# Patient Record
Sex: FEMALE | Race: WHITE | Employment: FULL TIME | URBAN - METROPOLITAN AREA
[De-identification: names, ages, dates, MRNs, and addresses within clinical notes are randomized per-mention and may not be internally consistent; named-entity substitution may affect disease eponyms.]

---

## 2017-04-12 ENCOUNTER — HOSPITAL ENCOUNTER (OUTPATIENT)
Dept: LAB | Age: 62
Discharge: HOME | End: 2017-04-12
Payer: COMMERCIAL

## 2017-04-12 ENCOUNTER — OFFICE VISIT (OUTPATIENT)
Dept: FAMILY MEDICINE CLINIC | Age: 62
End: 2017-04-12

## 2017-04-12 VITALS
OXYGEN SATURATION: 98 % | HEART RATE: 70 BPM | SYSTOLIC BLOOD PRESSURE: 94 MMHG | WEIGHT: 150 LBS | BODY MASS INDEX: 25.61 KG/M2 | HEIGHT: 64 IN | TEMPERATURE: 97.7 F | DIASTOLIC BLOOD PRESSURE: 65 MMHG | RESPIRATION RATE: 16 BRPM

## 2017-04-12 DIAGNOSIS — E78.5 HYPERLIPIDEMIA, UNSPECIFIED HYPERLIPIDEMIA TYPE: ICD-10-CM

## 2017-04-12 DIAGNOSIS — E16.1 HYPERINSULINEMIA: ICD-10-CM

## 2017-04-12 DIAGNOSIS — E03.4 HYPOTHYROIDISM DUE TO ACQUIRED ATROPHY OF THYROID: Primary | ICD-10-CM

## 2017-04-12 DIAGNOSIS — E55.9 VITAMIN D DEFICIENCY: ICD-10-CM

## 2017-04-12 DIAGNOSIS — M19.90 INFLAMMATORY OSTEOARTHRITIS: ICD-10-CM

## 2017-04-12 DIAGNOSIS — E03.4 HYPOTHYROIDISM DUE TO ACQUIRED ATROPHY OF THYROID: ICD-10-CM

## 2017-04-12 LAB
25(OH)D3 SERPL-MCNC: 36.8 NG/ML (ref 30–100)
ALBUMIN SERPL BCP-MCNC: 3.7 G/DL (ref 3.4–5)
ALBUMIN/GLOB SERPL: 1.4 {RATIO} (ref 0.8–1.7)
ALP SERPL-CCNC: 73 U/L (ref 45–117)
ALT SERPL-CCNC: 51 U/L (ref 13–56)
ANION GAP BLD CALC-SCNC: 6 MMOL/L (ref 3–18)
AST SERPL W P-5'-P-CCNC: 39 U/L (ref 15–37)
BASOPHILS # BLD AUTO: 0.1 K/UL (ref 0–0.06)
BASOPHILS # BLD: 1 % (ref 0–2)
BILIRUB SERPL-MCNC: 0.2 MG/DL (ref 0.2–1)
BUN SERPL-MCNC: 14 MG/DL (ref 7–18)
BUN/CREAT SERPL: 16 (ref 12–20)
CALCIUM SERPL-MCNC: 8.5 MG/DL (ref 8.5–10.1)
CHLORIDE SERPL-SCNC: 108 MMOL/L (ref 100–108)
CHOLEST SERPL-MCNC: 141 MG/DL
CO2 SERPL-SCNC: 29 MMOL/L (ref 21–32)
CREAT SERPL-MCNC: 0.87 MG/DL (ref 0.6–1.3)
DIFFERENTIAL METHOD BLD: ABNORMAL
EOSINOPHIL # BLD: 0.2 K/UL (ref 0–0.4)
EOSINOPHIL NFR BLD: 4 % (ref 0–5)
ERYTHROCYTE [DISTWIDTH] IN BLOOD BY AUTOMATED COUNT: 14.8 % (ref 11.6–14.5)
GLOBULIN SER CALC-MCNC: 2.7 G/DL (ref 2–4)
GLUCOSE SERPL-MCNC: 92 MG/DL (ref 74–99)
HBA1C MFR BLD: 6 % (ref 4.2–5.6)
HCT VFR BLD AUTO: 41.3 % (ref 35–45)
HDLC SERPL-MCNC: 78 MG/DL (ref 40–60)
HDLC SERPL: 1.8 {RATIO} (ref 0–5)
HGB BLD-MCNC: 12.7 G/DL (ref 12–16)
LDLC SERPL CALC-MCNC: 49.2 MG/DL (ref 0–100)
LIPID PROFILE,FLP: ABNORMAL
LYMPHOCYTES # BLD AUTO: 37 % (ref 21–52)
LYMPHOCYTES # BLD: 2.2 K/UL (ref 0.9–3.6)
MCH RBC QN AUTO: 27.3 PG (ref 24–34)
MCHC RBC AUTO-ENTMCNC: 30.8 G/DL (ref 31–37)
MCV RBC AUTO: 88.6 FL (ref 74–97)
MONOCYTES # BLD: 0.4 K/UL (ref 0.05–1.2)
MONOCYTES NFR BLD AUTO: 7 % (ref 3–10)
NEUTS SEG # BLD: 3.1 K/UL (ref 1.8–8)
NEUTS SEG NFR BLD AUTO: 51 % (ref 40–73)
PLATELET # BLD AUTO: 202 K/UL (ref 135–420)
PMV BLD AUTO: 12.1 FL (ref 9.2–11.8)
POTASSIUM SERPL-SCNC: 4.3 MMOL/L (ref 3.5–5.5)
PROT SERPL-MCNC: 6.4 G/DL (ref 6.4–8.2)
RBC # BLD AUTO: 4.66 M/UL (ref 4.2–5.3)
SODIUM SERPL-SCNC: 143 MMOL/L (ref 136–145)
TRIGL SERPL-MCNC: 69 MG/DL (ref ?–150)
TSH SERPL DL<=0.05 MIU/L-ACNC: 3.14 UIU/ML (ref 0.36–3.74)
VLDLC SERPL CALC-MCNC: 13.8 MG/DL
WBC # BLD AUTO: 6.1 K/UL (ref 4.6–13.2)

## 2017-04-12 PROCEDURE — 80061 LIPID PANEL: CPT | Performed by: INTERNAL MEDICINE

## 2017-04-12 PROCEDURE — 85025 COMPLETE CBC W/AUTO DIFF WBC: CPT | Performed by: INTERNAL MEDICINE

## 2017-04-12 PROCEDURE — 83036 HEMOGLOBIN GLYCOSYLATED A1C: CPT | Performed by: INTERNAL MEDICINE

## 2017-04-12 PROCEDURE — 84443 ASSAY THYROID STIM HORMONE: CPT | Performed by: INTERNAL MEDICINE

## 2017-04-12 PROCEDURE — 82306 VITAMIN D 25 HYDROXY: CPT | Performed by: INTERNAL MEDICINE

## 2017-04-12 PROCEDURE — 80053 COMPREHEN METABOLIC PANEL: CPT | Performed by: INTERNAL MEDICINE

## 2017-04-12 PROCEDURE — 36415 COLL VENOUS BLD VENIPUNCTURE: CPT | Performed by: INTERNAL MEDICINE

## 2017-04-12 NOTE — PROGRESS NOTES
Denver Roth is here today to follow up for chronic conditions. 1. Have you been to the ER, urgent care clinic since your last visit? Hospitalized since your last visit? No    2. Have you seen or consulted any other health care providers outside of the [facility] since your last visit? Include any pap smears or colon screening.  No

## 2017-04-12 NOTE — PATIENT INSTRUCTIONS

## 2017-04-12 NOTE — MR AVS SNAPSHOT
Visit Information Date & Time Provider Department Dept. Phone Encounter #  
 4/12/2017  8:45 AM Matt Tristan MD [facility] [id number] Follow-up Instructions Return in about 6 months (around 10/12/2017) for follow up. Upcoming Health Maintenance Date Due  
 GLAUCOMA SCREENING Q2Y 1/1/2017 BREAST CANCER SCRN MAMMOGRAM 6/1/2018 COLONOSCOPY 3/13/2019 PAP AKA CERVICAL CYTOLOGY 10/5/2019 DTaP/Tdap/Td series (2 - Td) 4/17/2025 Allergies as of 4/12/2017  Review Complete On: 4/12/2017 By: Matt Tristan MD  
  
 Severity Noted Reaction Type Reactions Iodinated Contrast Media - Oral And Iv Dye  03/27/2013    Other (comments) Severe overall skin reaction Prilosec [Omeprazole Magnesium]  03/27/2013    Nausea Only Current Immunizations  Reviewed on 10/21/2015 Name Date Influenza Vaccine 10/11/2016 Tdap 4/17/2015 Zoster Vaccine, Live 1/1/2015 Not reviewed this visit You Were Diagnosed With   
  
 Codes Comments Hypothyroidism due to acquired atrophy of thyroid    -  Primary ICD-10-CM: E03.4 ICD-9-CM: 244.8, 246.8 Hyperlipidemia, unspecified hyperlipidemia type     ICD-10-CM: E78.5 ICD-9-CM: 272.4 Vitamin D deficiency     ICD-10-CM: E55.9 ICD-9-CM: 268.9 Inflammatory osteoarthritis     ICD-10-CM: M19.90 ICD-9-CM: 715.90 Hyperinsulinemia     ICD-10-CM: E16.1 ICD-9-CM: 251.1 Vitals BP Pulse Temp Resp Height(growth percentile) Weight(growth percentile) 94/65 70 97.7 °F (36.5 °C) (Oral) 16 5' 4\" (1.626 m) 150 lb (68 kg) SpO2 BMI OB Status Smoking Status 98% 25.75 kg/m2 Postmenopausal Never Smoker Vitals History BMI and BSA Data Body Mass Index Body Surface Area 25.75 kg/m 2 1.75 m 2 Preferred Pharmacy Pharmacy Name Phone [address] [facility]. [phone] Your Updated Medication List  
  
 This list is accurate as of: 4/12/17  9:29 AM.  Always use your most recent med list.  
  
  
  
  
 CLARITIN 10 mg tablet Generic drug:  loratadine Take 10 mg by mouth daily. CRESTOR 20 mg tablet Generic drug:  rosuvastatin Take 20 mg by mouth nightly. multivitamin tablet Commonly known as:  ONE A DAY Take 1 Tab by mouth daily. PLAQUENIL 200 mg tablet Generic drug:  hydroxychloroquine Take 200 mg by mouth two (2) times a day. TYLENOL 325 mg tablet Generic drug:  acetaminophen Take  by mouth every four (4) hours as needed. VAGIFEM 10 mcg Tab vaginal tablet Generic drug:  estradiol Insert 10 mcg into vagina daily. VITAMIN D3 2,000 unit Tab Generic drug:  cholecalciferol (vitamin D3) Take  by mouth.  
  
 vitamin E 400 unit capsule Commonly known as:  [address] Take  by mouth daily. Take half tab daily ZANTAC 150 mg tablet Generic drug:  raNITIdine Take 150 mg by mouth two (2) times a day. Follow-up Instructions Return in about 6 months (around 10/12/2017) for follow up. To-Do List   
 04/12/2017 Lab:  CBC WITH AUTOMATED DIFF   
  
 04/12/2017 Lab:  HEMOGLOBIN A1C W/O EAG   
  
 04/12/2017 Lab:  LIPID PANEL   
  
 04/12/2017 Lab:  METABOLIC PANEL, COMPREHENSIVE   
  
 04/12/2017 Lab:  TSH 3RD GENERATION   
  
 04/12/2017 Lab:  VITAMIN D, 25 HYDROXY Patient Instructions High Cholesterol: Care Instructions Your Care Instructions Cholesterol is a type of fat in your blood. It is needed for many body functions, such as making new cells. Cholesterol is made by your body. It also comes from food you eat. High cholesterol means that you have too much of the fat in your blood. This raises your risk of a heart attack and stroke. LDL and HDL are part of your total cholesterol. LDL is the \"bad\" cholesterol.  High LDL can raise your risk for heart disease, heart attack, and stroke. HDL is the \"good\" cholesterol. It helps clear bad cholesterol from the body. High HDL is linked with a lower risk of heart disease, heart attack, and stroke. Your cholesterol levels help your doctor find out your risk for having a heart attack or stroke. You and your doctor can talk about whether you need to lower your risk and what treatment is best for you. A heart-healthy lifestyle along with medicines can help lower your cholesterol and your risk. The way you choose to lower your risk will depend on how high your risk is for heart attack and stroke. It will also depend on how you feel about taking medicines. Follow-up care is a key part of your treatment and safety. Be sure to make and go to all appointments, and call your doctor if you are having problems. It's also a good idea to know your test results and keep a list of the medicines you take. How can you care for yourself at home? · Eat a variety of foods every day. Good choices include fruits, vegetables, whole grains (like oatmeal), dried beans and peas, nuts and seeds, soy products (like tofu), and fat-free or low-fat dairy products. · Replace butter, margarine, and hydrogenated or partially hydrogenated oils with olive and canola oils. (Canola oil margarine without trans fat is fine.) · Replace red meat with fish, poultry, and soy protein (like tofu). · Limit processed and packaged foods like chips, crackers, and cookies. · Bake, broil, or steam foods. Don't osman them. · Be physically active. Get at least 30 minutes of exercise on most days of the week. Walking is a good choice. You also may want to do other activities, such as running, swimming, cycling, or playing tennis or team sports. · Stay at a healthy weight or lose weight by making the changes in eating and physical activity listed above. Losing just a small amount of weight, even 5 to 10 pounds, can reduce your risk for having a heart attack or stroke. · Do not smoke. When should you call for help? Watch closely for changes in your health, and be sure to contact your doctor if: 
· You need help making lifestyle changes. · You have questions about your medicine. Where can you learn more? Go to http://carina-alec.info/. Enter C267 in the search box to learn more about \"High Cholesterol: Care Instructions. \" Current as of: January 27, 2016 Content Version: 11.2 © 4707-1198 [employer]. Care instructions adapted under license by [employer] (which disclaims liability or warranty for this information). If you have questions about a medical condition or this instruction, always ask your healthcare professional. [address] any warranty or liability for your use of this information. Introducing [facility]! Dear Ghazala Teague: 
Thank you for requesting a [employer] account. Our records indicate that you already have an active [employer] account. You can access your account anytime at [email or web address] Did you know that you can access your hospital and ER discharge instructions at any time in [employer]? You can also review all of your test results from your hospital stay or ER visit. Additional Information If you have questions, please visit the Frequently Asked Questions section of the [employer] website at [email or web address] Remember, [employer] is NOT to be used for urgent needs. For medical emergencies, dial 911. Now available from your iPhone and Android! Please provide this summary of care documentation to your next provider. Your primary care clinician is listed as Joel Moralez. If you have any questions after today's visit, please call [phone].

## 2017-04-12 NOTE — PROGRESS NOTES
Chief Complaint   Patient presents with    Osteopenia     Follow up    Hypothyroidism    Cholesterol Problem    Osteoarthritis       Pt is a 60y.o. year old female who presents for follow up of her chronic medical problems    BP Readings from Last 3 Encounters:   04/12/17 94/65   10/19/16 106/73   04/20/16 110/78   Occasional dizziness    Wt Readings from Last 3 Encounters:   04/12/17 150 lb (68 kg)   10/19/16 147 lb 3.2 oz (66.8 kg)   10/21/15 170 lb 9.6 oz (77.4 kg)   148 lbs at home  Has been watching diet carefully; avoiding carbs for the most part; has maintained the weight loss for 6 months now! Lab Results   Component Value Date/Time    Cholesterol, total 158 10/19/2016 09:07 AM    HDL Cholesterol 64 10/19/2016 09:07 AM    LDL, calculated 77.6 10/19/2016 09:07 AM    VLDL, calculated 16.4 10/19/2016 09:07 AM    Triglyceride 82 10/19/2016 09:07 AM    CHOL/HDL Ratio 2.5 10/19/2016 09:07 AM   Fasting today  On Crestor-compliant and tolerating it    Plaquenil helping the pain on her fingers; sees Dr. 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 Jaiden Lopez quite 2 years yet       ROS:    Pt denies: Fever/Chills, HA, Visual changes, Fatigue, Chest pain, SOB, ROBERTS, Abd pain, N/V/D/C, Blood in stool or urine, Edema. Pertinent positive as above in HPI.  All others were negative    Patient Active Problem List   Diagnosis Code    Hyperlipidemia E78.5    Hyperinsulinemia E16.1    Vitamin D deficiency E55.9    Inflammatory osteoarthritis M19.90    Colon polyp K63.5    Family history of osteoporosis Z82.62    Hypothyroidism due to acquired atrophy of thyroid E03.4    Osteopenia M85.80    Advance directive discussed with patient Z70.80       Past Medical History:   Diagnosis Date    Anemia NEC     as a young child    Arthritis     GERD (gastroesophageal reflux disease)     Hypercholesterolemia     Hypothyroidism due to acquired atrophy of thyroid 4/22/2015       Current Outpatient Prescriptions   Medication Sig Dispense Refill    ranitidine (ZANTAC) 150 mg tablet Take 150 mg by mouth two (2) times a day.  estradiol (VAGIFEM) 10 mcg tab vaginal tablet Insert 10 mcg into vagina daily.  hydroxychloroquine (PLAQUENIL) 200 mg tablet Take 200 mg by mouth two (2) times a day.  rosuvastatin (CRESTOR) 20 mg tablet Take 20 mg by mouth nightly.  cholecalciferol, vitamin D3, (VITAMIN D3) 2,000 unit Tab Take  by mouth.  vitamin E (AQUA GEMS) 400 unit capsule Take  by mouth daily. Take half tab daily      loratadine (CLARITIN) 10 mg tablet Take 10 mg by mouth daily.  multivitamin (ONE A DAY) tablet Take 1 Tab by mouth daily.  acetaminophen (TYLENOL) 325 mg tablet Take  by mouth every four (4) hours as needed. History   Smoking Status    Never Smoker   Smokeless Tobacco    Never Used       Allergies   Allergen Reactions    Iodinated Contrast Media - Oral And Iv Dye Other (comments)     Severe overall skin reaction    Prilosec [Omeprazole Magnesium] Nausea Only       Patient Labs were reviewed: yes      Patient Past Records were reviewed:  yes        Objective:     Vitals:    04/12/17 0859   BP: 94/65   Pulse: 70   Resp: 16   Temp: 97.7 °F (36.5 °C)   TempSrc: Oral   SpO2: 98%   Weight: 150 lb (68 kg)   Height: 5' 4\" (1.626 m)     Body mass index is 25.75 kg/(m^2). Exam:   Appearance: alert, well appearing,  oriented to person, place, and time, acyanotic, in no respiratory distress and well hydrated.   HEENT:  NC/AT, pink conj, anicteric sclerae  Neck:  No cervical lymphadenopathy, no JVD, no thyromegaly, no carotid bruit  Heart:  RRR without M/R/G  Lungs:  CTAB, no rhonchi, rales, or wheezes with good air exchange   Abdomen:  Not examined  Ext:  No C/C/E , bony abnormalities noted on the PIP joints of the little fingers   Skin: no rash  Neuro: no lateralizing signs, CNs II-XII intact      Assessment/ Plan: [facility] was seen today for osteopenia, hypothyroidism, cholesterol problem and osteoarthritis. Diagnoses and all orders for this visit:    Hypothyroidism due to acquired atrophy of thyroid-subclinical; neg anti TPO  -     TSH 3RD GENERATION; Future    Hyperlipidemia, unspecified hyperlipidemia type-on Crestor; mild elevation of LFTs with last labs-if this goes up some more, may need to decrease dose of Crestor lion after she lost weight; also currently on CoQ10 to help decrease risk of the side effect of hyperglycemia from statins  -     LIPID PANEL; Future  -     METABOLIC PANEL, COMPREHENSIVE; Future    Vitamin D deficiency-on OTC vit D  -     VITAMIN D, 25 HYDROXY; Future    Inflammatory osteoarthritis-on Plaquenil, will let Christine know that she is in Plaquenil  -     CBC WITH AUTOMATED DIFF; Future    Hyperinsulinemia-likely resolved from recent weight loss by diet and exercise   -     HEMOGLOBIN A1C W/O EAG; Future        Follow-up Disposition:  Return in about 6 months (around 10/12/2017) for follow up. I have discussed the diagnosis with the patient and the intended plan as seen in the above orders. The patient has received an After-Visit Summary and questions were answered concerning future plans. Medication Side Effects and Warnings were discussed with patient: yes    Patient verbalized understanding of above instructions.     Zay Moss MD  Internal Medicine  [facility]

## 2017-05-30 DIAGNOSIS — N95.2 ATROPHIC VAGINITIS: Primary | ICD-10-CM

## 2017-05-30 RX ORDER — ESTRADIOL 10 UG/1
10 INSERT VAGINAL DAILY
Qty: 30 TAB | Refills: 6 | Status: SHIPPED | OUTPATIENT
Start: 2017-05-30 | End: 2017-10-03 | Stop reason: SDUPTHER

## 2017-10-03 DIAGNOSIS — N95.2 ATROPHIC VAGINITIS: ICD-10-CM

## 2017-10-04 RX ORDER — ESTRADIOL 10 UG/1
10 INSERT VAGINAL DAILY
Qty: 90 TAB | Refills: 3 | Status: SHIPPED | OUTPATIENT
Start: 2017-10-04 | End: 2017-10-10 | Stop reason: SDUPTHER

## 2017-10-04 NOTE — TELEPHONE ENCOUNTER
From: Yarelis Prince  To: Julián Wooten MD  Sent: 10/3/2017 9:52 PM EDT  Subject: Medication Renewal Request    Original authorizing provider: MD Edin Parada would like a refill of the following medications:  estradiol (VAGIFEM) 10 mcg tab vaginal tablet Julián Wooten MD]    Preferred pharmacy: [place] [id number], [address] [facility]. Comment:  please send a 90 day supply to [facility] pharmacy. The system will not let me change it here. Thanks.  Flaca Prince

## 2017-10-10 DIAGNOSIS — N95.2 ATROPHIC VAGINITIS: ICD-10-CM

## 2017-10-10 RX ORDER — ESTRADIOL 10 UG/1
10 INSERT VAGINAL DAILY
Qty: 90 TAB | Refills: 3 | Status: SHIPPED | OUTPATIENT
Start: 2017-10-10 | End: 2019-04-10 | Stop reason: SDUPTHER

## 2017-10-10 NOTE — TELEPHONE ENCOUNTER
From    Mindy Prince       To    Young Harris VIVA [place]       Sent    10/4/2017  8:43 PM          Good evening,   I requested a refill for my Vagifem to be sent to [address] [place] [address]. In the message I indicated that I was unable to set that as the pharmacy and assumed you would. But the refill was sent to [facility]].  Please change my preferred pharmacy to [facility].

## 2017-10-11 ENCOUNTER — OFFICE VISIT (OUTPATIENT)
Dept: FAMILY MEDICINE CLINIC | Age: 62
End: 2017-10-11

## 2017-10-11 VITALS
SYSTOLIC BLOOD PRESSURE: 101 MMHG | DIASTOLIC BLOOD PRESSURE: 77 MMHG | HEART RATE: 75 BPM | HEIGHT: 64 IN | TEMPERATURE: 96.9 F | RESPIRATION RATE: 17 BRPM | WEIGHT: 152 LBS | BODY MASS INDEX: 25.95 KG/M2

## 2017-10-11 DIAGNOSIS — M85.80 OSTEOPENIA, UNSPECIFIED LOCATION: ICD-10-CM

## 2017-10-11 DIAGNOSIS — E55.9 VITAMIN D DEFICIENCY: ICD-10-CM

## 2017-10-11 DIAGNOSIS — K21.9 GASTROESOPHAGEAL REFLUX DISEASE, ESOPHAGITIS PRESENCE NOT SPECIFIED: ICD-10-CM

## 2017-10-11 DIAGNOSIS — E16.1 HYPERINSULINEMIA: ICD-10-CM

## 2017-10-11 DIAGNOSIS — M19.90 INFLAMMATORY OSTEOARTHRITIS: ICD-10-CM

## 2017-10-11 DIAGNOSIS — E78.5 HYPERLIPIDEMIA, UNSPECIFIED HYPERLIPIDEMIA TYPE: Primary | ICD-10-CM

## 2017-10-11 DIAGNOSIS — E03.4 HYPOTHYROIDISM DUE TO ACQUIRED ATROPHY OF THYROID: ICD-10-CM

## 2017-10-11 DIAGNOSIS — Z71.84 TRAVEL ADVICE ENCOUNTER: ICD-10-CM

## 2017-10-11 RX ORDER — ACETAZOLAMIDE 125 MG/1
125 TABLET ORAL 2 TIMES DAILY
Qty: 14 TAB | Refills: 0 | Status: SHIPPED | OUTPATIENT
Start: 2017-10-11 | End: 2017-10-11 | Stop reason: SDUPTHER

## 2017-10-11 RX ORDER — ACETAZOLAMIDE 125 MG/1
125 TABLET ORAL 2 TIMES DAILY
Qty: 14 TAB | Refills: 0 | Status: SHIPPED | OUTPATIENT
Start: 2017-10-11 | End: 2017-10-18

## 2017-10-11 RX ORDER — ROSUVASTATIN CALCIUM 20 MG/1
20 TABLET, COATED ORAL
Qty: 90 TAB | Refills: 3 | Status: SHIPPED | OUTPATIENT
Start: 2017-10-11 | End: 2018-12-03 | Stop reason: SDUPTHER

## 2017-10-11 NOTE — PROGRESS NOTES
Chief Complaint   Patient presents with    Follow Up Chronic Condition     Cholesterol, Vitamin D     1. Have you been to the ER, urgent care clinic since your last visit? Hospitalized since your last visit? No    2. Have you seen or consulted any other health care providers outside of the [address] [place] [address] since your last visit? Include any pap smears or colon screening. Yes When: July 2017  Rheumologist- Dr. Benito Sic.

## 2017-10-11 NOTE — PATIENT INSTRUCTIONS
Learning About Healthy Travel Abroad  How can you stay healthy on your trip? The best way to stay healthy on your trip is to plan ahead. Talk with your doctor several months before you travel to another country. It's important to allow enough time to get the vaccine doses that you need. For example, if you need the hepatitis A vaccine, you'll need 2 doses spaced at least 6 months apart. Also ask your doctor if there are medicines or extra safety steps that you should take. Check with your local health department or travel health clinic for other travel tips. What can you do to prevent health problems? Get needed vaccines  · Make sure you are up to date with your routine shots. They can protect you from diseases such as polio, diphtheria, and measles. These diseases are still a problem in some developing countries. · Get other vaccines you need. Your doctor or a health clinic can tell you which ones you need for your travels. Here are some examples:  ¨ Hepatitis A vaccine, if you travel to developing countries. ¨ Yellow fever vaccine, if you visit places in [place] and [place] where the disease is active. ¨ Typhoid fever vaccine, if you travel to [facility] and [place], [place], or some areas of [place]. Bring medicines with you  · If you take medicines, bring a supply that will last the length of your trip. Get a letter from your doctor that lists your medical conditions and the medicines you take. Bring prescriptions for refills if you will be gone for a long time. Also bring any medical supplies you may need such as blood sugar testing supplies or insulin needles. · If you are going to an area where malaria is a risk, ask your doctor or health clinic for a prescription to help prevent infection. This medicine works best if you take it before, during, and after your trip. · You may want to bring medicine for traveler's diarrhea.  Over-the-counter medicines include:  ¨ Bismuth subsalicylate (Pepto-Bismol). ¨ Loperamide (Imodium). Your doctor may also prescribe an antibiotic to take with you. This can treat diarrhea if you're going to an area where modern medical care isn't readily available. Make safer choices as you travel  · Practice safer sex. Using condoms can prevent sexually transmitted infections. · In malaria-infected areas, use DEET insect repellent. Wear long pants and long-sleeved shirts, especially from dusk to alex. Use mosquito netting to protect yourself from bites while you sleep. · Many developing countries don't have safe tap water. Only have drinks made with boiled water, such as tea and coffee. Canned or bottled carbonated drinks, such as soda, beer, wine, or water, are usually safe. Don't use ice if you don't know what kind of water was used to make it. And don't use tap water to brush your teeth. · Be aware that you could be injured in cars, boats, or public transportation. Driving can be dangerous due to bad roads, poor  training, and crowded roadways. If you hire a  or taxi, ask the  to slow down or drive more carefully if you feel unsafe. · Air pollution in some large cities can be a problem if you have asthma or other breathing problems. Avoid those cities when air quality is poor. Or stay indoors as much as possible. · Be careful around dogs and other animals. Dogs in developing countries are often not tame and may bite. Rabies is more common in tropical and subtropical regions. · If you're going to a place that's much higher above sea level than you're used to, ask your doctor how to avoid altitude sickness. He or she may also prescribe medicine to help treat it. Where can you get the best information? · Use the Internet to find travel health information. Try these websites:  ¨ www.cdc.gov/travel. This website is for the Centers for Disease Control and Prevention (CDC). ¨ www.who.int/ith/en.  This website lists information from the [address] [employer] (WHO) on travel, required immunizations, and disease outbreaks. · Find out where you can get the best medical care in the region you are visiting. See the [address] website at www.aScentias.gov. It lists every [place] embassy worldwide. It also lists some doctors and medical facilities in those countries. · Take along the phone numbers and addresses of embassies in the areas you will visit. They can help you find a doctor or hospital. Find out if your insurance company will cover you. You may want to get special travel health insurance. · If you are taking a cruise, you can find your ship's health record on this website: www.cdc.gov/nceh/vsp. Where can you learn more? Go to http://carina-alec.info/. Enter R129 in the search box to learn more about \"Learning About Healthy Travel Abroad. \"  Current as of: March 3, 2017  Content Version: 11.3  © 4944-4928 [employer]. Care instructions adapted under license by [employer] (which disclaims liability or warranty for this information). If you have questions about a medical condition or this instruction, always ask your healthcare professional. [address] any warranty or liability for your use of this information.

## 2017-10-11 NOTE — MR AVS SNAPSHOT
Visit Information Date & Time Provider Department Dept. Phone Encounter #  
 10/11/2017  8:30 AM Ludivina Henson MD [facility] [address] [id number] Follow-up Instructions Return in about 6 months (around 4/11/2018) for follow up. Routing History Upcoming Health Maintenance Date Due  
 GLAUCOMA SCREENING Q2Y 1/1/2017 INFLUENZA AGE 9 TO ADULT 8/1/2017 COLONOSCOPY 3/13/2019 BREAST CANCER SCRN MAMMOGRAM 6/7/2019 PAP AKA CERVICAL CYTOLOGY 10/5/2019 DTaP/Tdap/Td series (2 - Td) 4/17/2025 Allergies as of 10/11/2017  Review Complete On: 10/11/2017 By: Ludivina Henson MD  
  
 Severity Noted Reaction Type Reactions Iodinated Contrast- Oral And Iv Dye  03/27/2013    Other (comments) Severe overall skin reaction Prilosec [Omeprazole Magnesium]  03/27/2013    Nausea Only Current Immunizations  Reviewed on 10/11/2017 Name Date Influenza Vaccine 9/25/2017, 10/11/2016 Tdap 4/17/2015 Zoster Vaccine, Live 1/1/2015 Reviewed by Ludivina Henson MD on 10/11/2017 at  8:53 AM  
You Were Diagnosed With   
  
 Codes Comments Hyperlipidemia, unspecified hyperlipidemia type    -  Primary ICD-10-CM: E78.5 ICD-9-CM: 272.4 Vitamin D deficiency     ICD-10-CM: E55.9 ICD-9-CM: 268.9 Hypothyroidism due to acquired atrophy of thyroid     ICD-10-CM: E03.4 ICD-9-CM: 244.8, 246.8 Hyperinsulinemia     ICD-10-CM: E16.1 ICD-9-CM: 251.1 Travel advice encounter     ICD-10-CM: Z71.89 ICD-9-CM: V65.49 Vitals BP Pulse Temp Resp Height(growth percentile) Weight(growth percentile) 101/77 75 96.9 °F (36.1 °C) 17 5' 4\" (1.626 m) 152 lb (68.9 kg) BMI OB Status Smoking Status 26.09 kg/m2 Postmenopausal Never Smoker BMI and BSA Data Body Mass Index Body Surface Area 26.09 kg/m 2 1.76 m 2 Preferred Pharmacy Pharmacy Name Phone [place] [address] [address] [phone] Your Updated Medication List  
  
   
This list is accurate as of: 10/11/17  9:07 AM.  Always use your most recent med list.  
  
  
  
  
 acetaZOLAMIDE 125 mg tablet Commonly known as:  DIAMOX Take 1 Tab by mouth two (2) times a day for 7 days. CLARITIN 10 mg tablet Generic drug:  loratadine Take 10 mg by mouth daily. estradiol 10 mcg Tab vaginal tablet Commonly known as:  Rockney Mohs Insert 1 Tab into vagina daily. multivitamin tablet Commonly known as:  ONE A DAY Take 1 Tab by mouth daily. PLAQUENIL 200 mg tablet Generic drug:  hydroxychloroquine Take 200 mg by mouth two (2) times a day. rosuvastatin 20 mg tablet Commonly known as:  CRESTOR Take 1 Tab by mouth nightly. TYLENOL 325 mg tablet Generic drug:  acetaminophen Take  by mouth every four (4) hours as needed. VITAMIN D3 2,000 unit Tab Generic drug:  cholecalciferol (vitamin D3) Take  by mouth.  
  
 vitamin E 400 unit capsule Commonly known as:  [address] Take  by mouth daily. Take half tab daily ZANTAC 150 mg tablet Generic drug:  raNITIdine Take 150 mg by mouth two (2) times a day. Prescriptions Sent to Pharmacy Refills  
 rosuvastatin (CRESTOR) 20 mg tablet 3 Sig: Take 1 Tab by mouth nightly. Class: Normal  
 Pharmacy: [address] Ph #: [phone] Route: Oral  
 acetaZOLAMIDE (DIAMOX) 125 mg tablet 0 Sig: Take 1 Tab by mouth two (2) times a day for 7 days. Class: Normal  
 Pharmacy: [address] Ph #: [phone] Route: Oral  
  
Follow-up Instructions Return in about 6 months (around 4/11/2018) for follow up. To-Do List   
 10/11/2017 Lab:  CBC WITH AUTOMATED DIFF   
  
 10/11/2017 Lab:  HEMOGLOBIN A1C W/O EAG   
  
 10/11/2017 Lab:  LIPID PANEL   
  
 10/11/2017 Lab: METABOLIC PANEL, COMPREHENSIVE   
  
 10/11/2017 Lab:  TSH 3RD GENERATION   
  
 10/11/2017 Lab:  VITAMIN D, 25 HYDROXY Patient Instructions Learning About Healthy Travel Abroad How can you stay healthy on your trip? The best way to stay healthy on your trip is to plan ahead. Talk with your doctor several months before you travel to another country. It's important to allow enough time to get the vaccine doses that you need. For example, if you need the hepatitis A vaccine, you'll need 2 doses spaced at least 6 months apart. Also ask your doctor if there are medicines or extra safety steps that you should take. Check with your local health department or travel health clinic for other travel tips. What can you do to prevent health problems? Get needed vaccines · Make sure you are up to date with your routine shots. They can protect you from diseases such as polio, diphtheria, and measles. These diseases are still a problem in some developing countries. · Get other vaccines you need. Your doctor or a health clinic can tell you which ones you need for your travels. Here are some examples: 
¨ Hepatitis A vaccine, if you travel to developing countries. ¨ Yellow fever vaccine, if you visit places in [place] and [place] where the disease is active. ¨ Typhoid fever vaccine, if you travel to [facility] and [place], [place], or some areas of [place]. Bring medicines with you · If you take medicines, bring a supply that will last the length of your trip. Get a letter from your doctor that lists your medical conditions and the medicines you take. Bring prescriptions for refills if you will be gone for a long time. Also bring any medical supplies you may need such as blood sugar testing supplies or insulin needles. · If you are going to an area where malaria is a risk, ask your doctor or health clinic for a prescription to help prevent infection.  This medicine works best if you take it before, during, and after your trip. · You may want to bring medicine for traveler's diarrhea. Over-the-counter medicines include: ¨ Bismuth subsalicylate (Pepto-Bismol). ¨ Loperamide (Imodium). Your doctor may also prescribe an antibiotic to take with you. This can treat diarrhea if you're going to an area where modern medical care isn't readily available. Make safer choices as you travel · Practice safer sex. Using condoms can prevent sexually transmitted infections. · In malaria-infected areas, use DEET insect repellent. Wear long pants and long-sleeved shirts, especially from dusk to alex. Use mosquito netting to protect yourself from bites while you sleep. · Many developing countries don't have safe tap water. Only have drinks made with boiled water, such as tea and coffee. Canned or bottled carbonated drinks, such as soda, beer, wine, or water, are usually safe. Don't use ice if you don't know what kind of water was used to make it. And don't use tap water to brush your teeth. · Be aware that you could be injured in cars, boats, or public transportation. Driving can be dangerous due to bad roads, poor  training, and crowded roadways. If you hire a  or taxi, ask the  to slow down or drive more carefully if you feel unsafe. · Air pollution in some large cities can be a problem if you have asthma or other breathing problems. Avoid those cities when air quality is poor. Or stay indoors as much as possible. · Be careful around dogs and other animals. Dogs in developing countries are often not tame and may bite. Rabies is more common in tropical and subtropical regions. · If you're going to a place that's much higher above sea level than you're used to, ask your doctor how to avoid altitude sickness. He or she may also prescribe medicine to help treat it. Where can you get the best information? · Use the Internet to find travel health information. Try these websites: ¨ www.cdc.gov/travel. This website is for the Centers for Disease Control and Prevention (CDC). ¨ www.who.int/ith/en. This website lists information from the [address]) on travel, required immunizations, and disease outbreaks. · Find out where you can get the best medical care in the region you are visiting. See the [address] [place] [address] [employer]'s website at www.Pixium Vision.gov. It lists every [place] embassy worldwide. It also lists some doctors and medical facilities in those countries. · Take along the phone numbers and addresses of embassies in the areas you will visit. They can help you find a doctor or hospital. Find out if your insurance company will cover you. You may want to get special travel health insurance. · If you are taking a cruise, you can find your ship's health record on this website: www.[facility].gov/nceh/vsp. Where can you learn more? Go to http://carina-alec.info/. Enter R129 in the search box to learn more about \"Learning About Healthy Travel Abroad. \" Current as of: March 3, 2017 Content Version: 11.3 © 2836-0532 Knovel, Incorporated. Care instructions adapted under license by [employer] (which disclaims liability or warranty for this information). If you have questions about a medical condition or this instruction, always ask your healthcare professional. [place] [address] any warranty or liability for your use of this information. Introducing [facility] [place] & HEALTH SERVICES! Dear Sage Mays: 
Thank you for requesting a CharityStars account. Our records indicate that you already have an active CharityStars account. You can access your account anytime at https://"Ambition[employer]". Digital Guardian/"[employer] Inc" Did you know that you can access your hospital and ER discharge instructions at any time in CharityStars?   You can also review all of your test results from your hospital stay or ER visit. Additional Information If you have questions, please visit the Frequently Asked Questions section of the SKINNYprice website at https://[email or web address] Cave [email or web address]. [email or web address].[email or web address]. [employer]/mychart/. Remember, SKINNYprice is NOT to be used for urgent needs. For medical emergencies, dial 911. Now available from your iPhone and Android! Please provide this summary of care documentation to your next provider. Your primary care clinician is listed as Nano Tomlin. If you have any questions after today's visit, please call [phone].

## 2017-10-11 NOTE — PROGRESS NOTES
Chief Complaint   Patient presents with    Follow Up Chronic Condition     Cholesterol, Vitamin D       Pt is a 60y.o. year old female who presents for follow up of her chronic medical problems    Mom passed away recently at age 80    Going [facility] and [facility]-needs diamox for possible altitude sickness    Health Maintenance Due   Topic Date Due    GLAUCOMA SCREENING Q2Y  01/01/2017-done    INFLUENZA AGE 9 TO ADULT  08/01/2017-done     Wt Readings from Last 3 Encounters:   10/11/17 152 lb (68.9 kg)   04/12/17 150 lb (68 kg)   10/19/16 147 lb 3.2 oz (66.8 kg)     Lab Results   Component Value Date/Time    Cholesterol, total 141 04/12/2017 09:29 AM    HDL Cholesterol 78 04/12/2017 09:29 AM    LDL, calculated 49.2 04/12/2017 09:29 AM    VLDL, calculated 13.8 04/12/2017 09:29 AM    Triglyceride 69 04/12/2017 09:29 AM    CHOL/HDL Ratio 1.8 04/12/2017 09:29 AM   fasting today  On Crestor-no side effects    Lab Results   Component Value Date/Time    TSH 3.14 04/12/2017 09:29 AM    TSH 3.57 04/22/2015 09:00 AM   Has maintained her weight loss by watching diet closely     Previous  hx of elevated A1C, hyperinsulinemia  Denies polyuria, polydipsia and polyphagia      On Plaquenil for inflammatory arthritis on the fingers-Rheum following  Eye exam is current    On Vagifem; previously rxd by gyn      ROS:    Pt denies: Wt loss, Fever/Chills, HA, Visual changes, Fatigue, Chest pain, SOB, ROBERTS, Abd pain, N/V/D/C, Blood in stool or urine, Edema. Pertinent positive as above in HPI.  All others were negative    Patient Active Problem List   Diagnosis Code    Hyperlipidemia E78.5    Hyperinsulinemia E16.1    Vitamin D deficiency E55.9    Inflammatory osteoarthritis M19.90    Colon polyp K63.5    Family history of osteoporosis Z82.62    Hypothyroidism due to acquired atrophy of thyroid E03.4    Osteopenia M85.80    Advance directive discussed with patient Z70.80    Gastroesophageal reflux disease K21.9       Past Medical History:   Diagnosis Date    Anemia NEC     as a young child    Arthritis     GERD (gastroesophageal reflux disease)     Hypercholesterolemia     Hypothyroidism due to acquired atrophy of thyroid 4/22/2015       Current Outpatient Prescriptions   Medication Sig Dispense Refill    rosuvastatin (CRESTOR) 20 mg tablet Take 1 Tab by mouth nightly. 90 Tab 3    estradiol (VAGIFEM) 10 mcg tab vaginal tablet Insert 1 Tab into vagina daily. 90 Tab 3    ranitidine (ZANTAC) 150 mg tablet Take 150 mg by mouth two (2) times a day.  hydroxychloroquine (PLAQUENIL) 200 mg tablet Take 200 mg by mouth two (2) times a day.  cholecalciferol, vitamin D3, (VITAMIN D3) 2,000 unit Tab Take  by mouth.  vitamin E (AQUA GEMS) 400 unit capsule Take  by mouth daily. Take half tab daily      loratadine (CLARITIN) 10 mg tablet Take 10 mg by mouth daily.  multivitamin (ONE A DAY) tablet Take 1 Tab by mouth daily.  acetaminophen (TYLENOL) 325 mg tablet Take  by mouth every four (4) hours as needed. History   Smoking Status    Never Smoker   Smokeless Tobacco    Never Used       Allergies   Allergen Reactions    Iodinated Contrast- Oral And Iv Dye Other (comments)     Severe overall skin reaction    Prilosec [Omeprazole Magnesium] Nausea Only       Patient Labs were reviewed: yes      Patient Past Records were reviewed:  yes        Objective:     Vitals:    10/11/17 0836   BP: 101/77   Pulse: 75   Resp: 17   Temp: 96.9 °F (36.1 °C)   Weight: 152 lb (68.9 kg)   Height: 5' 4\" (1.626 m)     Body mass index is 26.09 kg/(m^2). Exam:   Appearance: alert, well appearing,  oriented to person, place, and time, acyanotic, in no respiratory distress and well hydrated.   HEENT:  NC/AT, pink conj, anicteric sclerae  Neck:  No cervical lymphadenopathy, no JVD, no thyromegaly, no carotid bruit  Heart:  RRR without M/R/G  Lungs:  CTAB, no rhonchi, rales, or wheezes with good air exchange   Abdomen:  Non-tender, pos bowel sounds, no hepatosplenomegaly  Ext:  No C/C/E    Skin: no rash  Neuro: no lateralizing signs, CNs II-XII intact      Assessment/ Plan:   Diagnoses and all orders for this visit:    1. Hyperlipidemia, unspecified hyperlipidemia type-continue with Crestor  -     rosuvastatin (CRESTOR) 20 mg tablet; Take 1 Tab by mouth nightly. -     METABOLIC PANEL, COMPREHENSIVE; Future  -     LIPID PANEL; Future    2. Vitamin D deficiency-on OTC Vit d  -     VITAMIN D, 25 HYDROXY; Future    3. Hypothyroidism due to acquired atrophy of thyroid-not on any meds, TSH has been less than 5  -     TSH 3RD GENERATION; Future    4. Hyperinsulinemia-continue to watch diet  -     HEMOGLOBIN A1C W/O EAG; Future    5. Travel advice encounter-may need Diamox for altitude sickness  -     CBC WITH AUTOMATED DIFF; Future  -     acetaZOLAMIDE (DIAMOX) 125 mg tablet; Take 1 Tab by mouth two (2) times a day for 7 days. 6. Osteopenia, unspecified location-Dexa due 10/2018, continue with Ca+D, weight bearing exercises    7. Inflammatory osteoarthritis-on Plaquenil, Rheum following    8. Gastroesophageal reflux disease, esophagitis presence not specified-on Zantac BID    9. On Vagifem for atrophic vaginitis-will obtain last note from [facility] [place]    10. Hx of colon polyps-colonoscopy up to date          Follow-up Disposition:  Return in about 6 months (around 4/11/2018) for follow up. I have discussed the diagnosis with the patient and the intended plan as seen in the above orders. The patient has received an After-Visit Summary and questions were answered concerning future plans. Medication Side Effects and Warnings were discussed with patient: yes    Patient verbalized understanding of above instructions.     Riki Wang MD  Internal Medicine  [facility]

## 2017-10-12 LAB
25(OH)D3+25(OH)D2 SERPL-MCNC: 52.9 NG/ML (ref 30–100)
ALBUMIN SERPL-MCNC: 3.9 G/DL (ref 3.6–4.8)
ALBUMIN/GLOB SERPL: 1.7 {RATIO} (ref 1.2–2.2)
ALP SERPL-CCNC: 70 IU/L (ref 39–117)
ALT SERPL-CCNC: 23 IU/L (ref 0–32)
AST SERPL-CCNC: 31 IU/L (ref 0–40)
BASOPHILS # BLD AUTO: 0.1 X10E3/UL (ref 0–0.2)
BASOPHILS NFR BLD AUTO: 1 %
BILIRUB SERPL-MCNC: <0.2 MG/DL (ref 0–1.2)
BUN SERPL-MCNC: 8 MG/DL (ref 8–27)
BUN/CREAT SERPL: 11 (ref 12–28)
CALCIUM SERPL-MCNC: 9.2 MG/DL (ref 8.7–10.3)
CHLORIDE SERPL-SCNC: 107 MMOL/L (ref 96–106)
CHOLEST SERPL-MCNC: 153 MG/DL (ref 100–199)
CO2 SERPL-SCNC: 25 MMOL/L (ref 18–29)
CREAT SERPL-MCNC: 0.75 MG/DL (ref 0.57–1)
EOSINOPHIL # BLD AUTO: 0.2 X10E3/UL (ref 0–0.4)
EOSINOPHIL NFR BLD AUTO: 3 %
ERYTHROCYTE [DISTWIDTH] IN BLOOD BY AUTOMATED COUNT: 14.8 % (ref 12.3–15.4)
GLOBULIN SER CALC-MCNC: 2.3 G/DL (ref 1.5–4.5)
GLUCOSE SERPL-MCNC: 91 MG/DL (ref 65–99)
HBA1C MFR BLD: 5.8 % (ref 4.8–5.6)
HCT VFR BLD AUTO: 37.2 % (ref 34–46.6)
HDLC SERPL-MCNC: 74 MG/DL
HGB BLD-MCNC: 11.7 G/DL (ref 11.1–15.9)
IMM GRANULOCYTES # BLD: 0 X10E3/UL (ref 0–0.1)
IMM GRANULOCYTES NFR BLD: 0 %
INTERPRETATION, 910389: NORMAL
LDLC SERPL CALC-MCNC: 66 MG/DL (ref 0–99)
LYMPHOCYTES # BLD AUTO: 2 X10E3/UL (ref 0.7–3.1)
LYMPHOCYTES NFR BLD AUTO: 35 %
MCH RBC QN AUTO: 26.3 PG (ref 26.6–33)
MCHC RBC AUTO-ENTMCNC: 31.5 G/DL (ref 31.5–35.7)
MCV RBC AUTO: 84 FL (ref 79–97)
MONOCYTES # BLD AUTO: 0.4 X10E3/UL (ref 0.1–0.9)
MONOCYTES NFR BLD AUTO: 7 %
NEUTROPHILS # BLD AUTO: 3.2 X10E3/UL (ref 1.4–7)
NEUTROPHILS NFR BLD AUTO: 54 %
PLATELET # BLD AUTO: 206 X10E3/UL (ref 150–379)
POTASSIUM SERPL-SCNC: 4.1 MMOL/L (ref 3.5–5.2)
PROT SERPL-MCNC: 6.2 G/DL (ref 6–8.5)
RBC # BLD AUTO: 4.45 X10E6/UL (ref 3.77–5.28)
SODIUM SERPL-SCNC: 144 MMOL/L (ref 134–144)
TRIGL SERPL-MCNC: 65 MG/DL (ref 0–149)
TSH SERPL DL<=0.005 MIU/L-ACNC: 4.25 UIU/ML (ref 0.45–4.5)
VLDLC SERPL CALC-MCNC: 13 MG/DL (ref 5–40)
WBC # BLD AUTO: 5.8 X10E3/UL (ref 3.4–10.8)

## 2017-10-14 PROBLEM — K21.9 GASTROESOPHAGEAL REFLUX DISEASE: Status: ACTIVE | Noted: 2017-10-14

## 2018-04-18 ENCOUNTER — OFFICE VISIT (OUTPATIENT)
Dept: FAMILY MEDICINE CLINIC | Age: 63
End: 2018-04-18

## 2018-04-18 VITALS
OXYGEN SATURATION: 98 % | BODY MASS INDEX: 27.31 KG/M2 | TEMPERATURE: 97.7 F | SYSTOLIC BLOOD PRESSURE: 117 MMHG | WEIGHT: 160 LBS | DIASTOLIC BLOOD PRESSURE: 74 MMHG | RESPIRATION RATE: 17 BRPM | HEIGHT: 64 IN | HEART RATE: 69 BPM

## 2018-04-18 DIAGNOSIS — M85.80 OSTEOPENIA, UNSPECIFIED LOCATION: ICD-10-CM

## 2018-04-18 DIAGNOSIS — E66.3 OVERWEIGHT (BMI 25.0-29.9): ICD-10-CM

## 2018-04-18 DIAGNOSIS — K21.9 GASTROESOPHAGEAL REFLUX DISEASE, ESOPHAGITIS PRESENCE NOT SPECIFIED: ICD-10-CM

## 2018-04-18 DIAGNOSIS — E03.4 HYPOTHYROIDISM DUE TO ACQUIRED ATROPHY OF THYROID: ICD-10-CM

## 2018-04-18 DIAGNOSIS — E16.1 HYPERINSULINEMIA: ICD-10-CM

## 2018-04-18 DIAGNOSIS — E78.5 HYPERLIPIDEMIA, UNSPECIFIED HYPERLIPIDEMIA TYPE: Primary | ICD-10-CM

## 2018-04-18 DIAGNOSIS — E55.9 VITAMIN D DEFICIENCY: ICD-10-CM

## 2018-04-18 NOTE — MR AVS SNAPSHOT
Guerda Meyers [place] [address] [facility] [address] [facility] [phone] [id number] 
[phone] Patient: Samir Green 
MRN: [id number] UAL:[birth year] Visit Information Date & Time Provider Department Dept. Phone Encounter #  
 4/18/2018  8:30 AM MD Domi Robertskatherine 13 [id number] Follow-up Instructions Return in about 6 months (around 10/18/2018) for follow up. Upcoming Health Maintenance Date Due  
 GLAUCOMA SCREENING Q2Y 1/1/2017 COLONOSCOPY 3/13/2019 BREAST CANCER SCRN MAMMOGRAM 6/7/2019 PAP AKA CERVICAL CYTOLOGY 10/5/2019 DTaP/Tdap/Td series (2 - Td) 4/17/2025 Allergies as of 4/18/2018  Review Complete On: 4/18/2018 By: Rome Calvillo LPN Severity Noted Reaction Type Reactions Iodinated Contrast- Oral And Iv Dye  03/27/2013    Other (comments) Severe overall skin reaction Prilosec [Omeprazole Magnesium]  03/27/2013    Nausea Only Current Immunizations  Reviewed on 10/11/2017 Name Date Influenza Vaccine 9/25/2017, 10/11/2016 Tdap 4/17/2015 Zoster Vaccine, Live 1/1/2015 Not reviewed this visit You Were Diagnosed With   
  
 Codes Comments Overweight (BMI 25.0-29.9)    -  Primary ICD-10-CM: B56.1 ICD-9-CM: 278.02 Hyperlipidemia, unspecified hyperlipidemia type     ICD-10-CM: E78.5 ICD-9-CM: 272.4 Vitamin D deficiency     ICD-10-CM: E55.9 ICD-9-CM: 268.9 Hypothyroidism due to acquired atrophy of thyroid     ICD-10-CM: E03.4 ICD-9-CM: 244.8, 246.8 Osteopenia, unspecified location     ICD-10-CM: M85.80 ICD-9-CM: 733.90 Gastroesophageal reflux disease, esophagitis presence not specified     ICD-10-CM: K21.9 ICD-9-CM: 530.81 Hyperinsulinemia     ICD-10-CM: E16.1 ICD-9-CM: 251.1 Vitals BP Pulse Temp Resp Height(growth percentile) Weight(growth percentile) 117/74 69 97.7 °F (36.5 °C) (Oral) 17 5' 4\" (1.626 m) 160 lb (72.6 kg) SpO2 BMI OB Status Smoking Status 98% 27.46 kg/m2 Postmenopausal Never Smoker Vitals History BMI and BSA Data Body Mass Index Body Surface Area  
 27.46 kg/m 2 1.81 m 2 Preferred Pharmacy Pharmacy Name Phone  [address] [phone] Your Updated Medication List  
  
   
This list is accurate as of 4/18/18  9:13 AM.  Always use your most recent med list.  
  
  
  
  
 CLARITIN 10 mg tablet Generic drug:  loratadine Take 10 mg by mouth daily. estradiol 10 mcg Tab vaginal tablet Commonly known as:  Bozena Dent Insert 1 Tab into vagina daily. multivitamin tablet Commonly known as:  ONE A DAY Take 1 Tab by mouth daily. PLAQUENIL 200 mg tablet Generic drug:  hydroxychloroquine Take 400 mg by mouth daily. rosuvastatin 20 mg tablet Commonly known as:  CRESTOR Take 1 Tab by mouth nightly. TYLENOL 325 mg tablet Generic drug:  acetaminophen Take  by mouth every four (4) hours as needed. VITAMIN D3 2,000 unit Tab Generic drug:  cholecalciferol (vitamin D3) Take  by mouth.  
  
 vitamin E 400 unit capsule Commonly known as:  [address] Take  by mouth daily. Take half tab daily ZANTAC 150 mg tablet Generic drug:  raNITIdine Take 150 mg by mouth two (2) times a day. Follow-up Instructions Return in about 6 months (around 10/18/2018) for follow up. To-Do List   
 04/18/2018 Lab:  HEMOGLOBIN A1C W/O EAG   
  
 04/18/2018 Lab:  LIPID PANEL   
  
 04/18/2018 Lab:  METABOLIC PANEL, COMPREHENSIVE   
  
 04/18/2018 Lab:  TSH 3RD GENERATION   
  
 04/18/2018 Lab:  VITAMIN D, 25 HYDROXY Patient Instructions Learning About Osteopenia What is osteopenia? Osteopenia is a decrease in thickness, or density, in bones.  That means the bones become thinner and weaker. It is much more common in women than in men. It is an early form of osteoporosis, a condition in which the bones are so thin and weak that they can break easily. It's important to know that osteopenia is not a disease. It can happen normally with aging. Having osteopenia means that there is a greater risk that you may get osteoporosis. It also means that you are more likely to break a bone than someone who does not have osteopenia. But not everyone with osteopenia gets osteoporosis or breaks a bone. Osteopenia doesn't cause any symptoms. It's usually found with a type of X-ray called a bone density test. Osteopenia means that your bone density result (T-score) is between -1.0 and -2.5. What increases the risk for osteopenia? Things that increase your risk include: 
· Having a family history of osteoporosis. · Being thin. · Being white or . · Getting too little physical activity. · Smoking. · Drinking too much alcohol often. · Using certain medicines such as steroids. How can you prevent osteoporosis? There are things you can do to slow down osteopenia and prevent osteoporosis. Certain lifestyle changes will help slow the loss of bone density. · Eat food that has plenty of calcium and vitamin D. Yogurt, cheese, milk, and dark green vegetables are high in calcium. Eggs, fatty fish, cereal, and fortified milk are high in vitamin D. 
· Talk to your doctor about taking a calcium supplement that has vitamin D in it. · Get regular exercise. ¨ Do 30 minutes of weight-bearing exercise on most days of the week. Walking, jogging, stair climbing, and dancing are good choices. ¨ Do resistance exercises with weights or elastic bands 2 or 3 days a week. · Limit alcohol to 2 drinks a day for men and 1 drink a day for women. Too much alcohol can cause health problems. · Do not smoke. Smoking can make bones thin faster.  If you need help quitting, talk to your doctor about stop-smoking programs and medicines. These can increase your chances of quitting for good. Prescription medicines are available for treating bone thinning. But these are more often used to treat osteoporosis. Follow-up care is a key part of your treatment and safety. Be sure to make and go to all appointments, and call your doctor if you are having problems. It's also a good idea to know your test results and keep a list of the medicines you take. Where can you learn more? Go to http://carina-alec.info/. Enter T225 in the search box to learn more about \"Learning About Osteopenia. \" Current as of: May 12, 2017 Content Version: 11.4 © 4915-7657 ProofPilot. Care instructions adapted under license by [employer] (which disclaims liability or warranty for this information). If you have questions about a medical condition or this instruction, always ask your healthcare professional. [address] any warranty or liability for your use of this information. Introducing [facility] & HEALTH SERVICES! Dear Pernell Ashford: 
Thank you for requesting a Workbooks account. Our records indicate that you already have an active Workbooks account. You can access your account anytime at [email or web address]. [employer] Holdings Group/EdgeInova International Did you know that you can access your hospital and ER discharge instructions at any time in Workbooks? You can also review all of your test results from your hospital stay or ER visit. Additional Information If you have questions, please visit the Frequently Asked Questions section of the Workbooks website at [email or web address]. [employer] Group/Mobile Backstaget/. Remember, Workbooks is NOT to be used for urgent needs. For medical emergencies, dial 911. Now available from your iPhone and Android! Please provide this summary of care documentation to your next provider. Your primary care clinician is listed as Abbey Keating. If you have any questions after today's visit, please call [phone].

## 2018-04-18 NOTE — PROGRESS NOTES
Chief Complaint   Patient presents with    Follow-up     6 month; patient is fasting     1. Have you been to the ER, urgent care clinic since your last visit? Hospitalized since your last visit? No    2. Have you seen or consulted any other health care providers outside of the [facility] since your last visit? Include any pap smears or colon screening.  No

## 2018-04-18 NOTE — PROGRESS NOTES
Chief Complaint   Patient presents with    Follow-up     6 month; patient is fasting       Pt is a 58y.o. year old female who presents for follow up of her chronic medical problems    Health Maintenance Due   Topic Date Due    GLAUCOMA SCREENING Q2Y  01/01/2017-wears eyeglasses/in chart     Wt Readings from Last 3 Encounters:   04/18/18 160 lb (72.6 kg)   10/11/17 152 lb (68.9 kg)   04/12/17 150 lb (68 kg)   BMI 27  Cannot keep 10 lbs off-on and off    BP Readings from Last 3 Encounters:   04/18/18 117/74   10/11/17 101/77   04/12/17 94/65       Lab Results   Component Value Date/Time    TSH 4.250 10/11/2017 12:00 AM    TSH 3.57 04/22/2015 09:00 AM   Not on thyroid med    Lab Results   Component Value Date/Time    Cholesterol, total 153 10/11/2017 12:00 AM    HDL Cholesterol 74 10/11/2017 12:00 AM    LDL, calculated 66 10/11/2017 12:00 AM    VLDL, calculated 13 10/11/2017 12:00 AM    Triglyceride 65 10/11/2017 12:00 AM    CHOL/HDL Ratio 1.8 04/12/2017 09:29 AM   Fasting? On Crestor-compliant    Lab Results   Component Value Date/Time    Hemoglobin A1c 5.8 (H) 10/11/2017 12:00 AM   No sxs of DM    Right wrist fracture after a fall-?fragility fracxture/dexa due in Oct  DEXA IMPRESSION:   Bone mineral density measures osteopenic at lumbar spine and bilateral femoral  neck. Statistical significant interval increase in bone mineral density lumbar  spine from comparison study of 09/24/2014. Lab Results   Component Value Date/Time    Vitamin D 25-Hydroxy 36.8 04/12/2017 09:29 AM    VITAMIN D, 25-HYDROXY 54.8 04/18/2018 12:00 AM     On rx    Vagifem 2/a week only use      ROS:    Pt denies: Wt loss, Fever/Chills, HA, Visual changes, Fatigue, Chest pain, SOB, ROBERTS, Abd pain, N/V/D/C, Blood in stool or urine, Edema. Pertinent positive as above in HPI.  All others were negative    Patient Active Problem List   Diagnosis Code    Hyperlipidemia E78.5    Hyperinsulinemia E16.1    Vitamin D deficiency E55.9    Inflammatory osteoarthritis M19.90    Colon polyp K63.5    Family history of osteoporosis Z82.62    Hypothyroidism due to acquired atrophy of thyroid E03.4    Osteopenia M85.80    Advance directive discussed with patient Z70.80    Gastroesophageal reflux disease K21.9    Overweight (BMI 25.0-29. 9) E66.3       Past Medical History:   Diagnosis Date    Anemia NEC     as a young child    Arthritis     GERD (gastroesophageal reflux disease)     Hypercholesterolemia     Hypothyroidism due to acquired atrophy of thyroid 4/22/2015       Current Outpatient Prescriptions   Medication Sig Dispense Refill    rosuvastatin (CRESTOR) 20 mg tablet Take 1 Tab by mouth nightly. 90 Tab 3    estradiol (VAGIFEM) 10 mcg tab vaginal tablet Insert 1 Tab into vagina daily. 90 Tab 3    ranitidine (ZANTAC) 150 mg tablet Take 150 mg by mouth two (2) times a day.  hydroxychloroquine (PLAQUENIL) 200 mg tablet Take 400 mg by mouth daily.  cholecalciferol, vitamin D3, (VITAMIN D3) 2,000 unit Tab Take  by mouth.  vitamin E (AQUA GEMS) 400 unit capsule Take  by mouth daily. Take half tab daily      loratadine (CLARITIN) 10 mg tablet Take 10 mg by mouth daily.  multivitamin (ONE A DAY) tablet Take 1 Tab by mouth daily.  acetaminophen (TYLENOL) 325 mg tablet Take  by mouth every four (4) hours as needed. History   Smoking Status    Never Smoker   Smokeless Tobacco    Never Used       Allergies   Allergen Reactions    Iodinated Contrast- Oral And Iv Dye Other (comments)     Severe overall skin reaction    Prilosec [Omeprazole Magnesium] Nausea Only       Patient Labs were reviewed: yes      Patient Past Records were reviewed:  yes        Objective:     Vitals:    04/18/18 0832   BP: 117/74   Pulse: 69   Resp: 17   Temp: 97.7 °F (36.5 °C)   TempSrc: Oral   SpO2: 98%   Weight: 160 lb (72.6 kg)   Height: 5' 4\" (1.626 m)     Body mass index is 27.46 kg/(m^2).     Exam:   Appearance: alert, well appearing,  oriented to person, place, and time, acyanotic, in no respiratory distress and well hydrated. HEENT:  NC/AT, pink conj, anicteric sclerae  Neck:  No cervical lymphadenopathy, no JVD, no thyromegaly, no carotid bruit  Heart:  RRR without M/R/G  Lungs:  CTAB, no rhonchi, rales, or wheezes with good air exchange   Abdomen:  Non-tender, pos bowel sounds, no hepatosplenomegaly  Ext:  No C/C/E    Skin: no rash  Neuro: no lateralizing signs, CNs II-XII intact      Assessment/ Plan:   Diagnoses and all orders for this visit:    1. Hyperlipidemia, unspecified hyperlipidemia type-continue with Crestor  -     LIPID PANEL; Future  -     METABOLIC PANEL, COMPREHENSIVE; Future    2. Hypothyroidism due to acquired atrophy of thyroid-not on replacement; will continue to monitor TSH  -     TSH 3RD GENERATION; Future    3. Vitamin D deficiency-on OTC Vit D  -     VITAMIN D, 25 HYDROXY; Future    4. Osteopenia, unspecified location-repeat DExa next visit lion after recent wrist fracture    5. Gastroesophageal reflux disease, esophagitis presence not specified-on Zantac, off PPI    6. Hyperinsulinemia-continue with present diet and exercise  -     HEMOGLOBIN A1C W/O EAG; Future    7. Overweight (BMI 25.0-29. 9)-patient already on diet and exercises regularly  -     TSH 3RD GENERATION; Future      Follow-up Disposition:  Return in about 6 months (around 10/18/2018) for follow up. I have discussed the diagnosis with the patient and the intended plan as seen in the above orders. The patient has received an After-Visit Summary and questions were answered concerning future plans. Medication Side Effects and Warnings were discussed with patient: yes    Patient verbalized understanding of above instructions.     Oh Shaver MD  Internal Medicine  [address] [facility]

## 2018-04-18 NOTE — PATIENT INSTRUCTIONS
Learning About Osteopenia  What is osteopenia? Osteopenia is a decrease in thickness, or density, in bones. That means the bones become thinner and weaker. It is much more common in women than in men. It is an early form of osteoporosis, a condition in which the bones are so thin and weak that they can break easily. It's important to know that osteopenia is not a disease. It can happen normally with aging. Having osteopenia means that there is a greater risk that you may get osteoporosis. It also means that you are more likely to break a bone than someone who does not have osteopenia. But not everyone with osteopenia gets osteoporosis or breaks a bone. Osteopenia doesn't cause any symptoms. It's usually found with a type of X-ray called a bone density test. Osteopenia means that your bone density result (T-score) is between -1.0 and -2.5. What increases the risk for osteopenia? Things that increase your risk include:  · Having a family history of osteoporosis. · Being thin. · Being white or . · Getting too little physical activity. · Smoking. · Drinking too much alcohol often. · Using certain medicines such as steroids. How can you prevent osteoporosis? There are things you can do to slow down osteopenia and prevent osteoporosis. Certain lifestyle changes will help slow the loss of bone density. · Eat food that has plenty of calcium and vitamin D. Yogurt, cheese, milk, and dark green vegetables are high in calcium. Eggs, fatty fish, cereal, and fortified milk are high in vitamin D.  · Talk to your doctor about taking a calcium supplement that has vitamin D in it. · Get regular exercise. ¨ Do 30 minutes of weight-bearing exercise on most days of the week. Walking, jogging, stair climbing, and dancing are good choices. ¨ Do resistance exercises with weights or elastic bands 2 or 3 days a week. · Limit alcohol to 2 drinks a day for men and 1 drink a day for women.  Too much alcohol can cause health problems. · Do not smoke. Smoking can make bones thin faster. If you need help quitting, talk to your doctor about stop-smoking programs and medicines. These can increase your chances of quitting for good. Prescription medicines are available for treating bone thinning. But these are more often used to treat osteoporosis. Follow-up care is a key part of your treatment and safety. Be sure to make and go to all appointments, and call your doctor if you are having problems. It's also a good idea to know your test results and keep a list of the medicines you take. Where can you learn more? Go to http://carina-alec.info/. Enter V719 in the search box to learn more about \"Learning About Osteopenia. \"  Current as of: May 12, 2017  Content Version: 11.4  © 6843-6850 Healthwise, Incorporated. Care instructions adapted under license by [employer] (which disclaims liability or warranty for this information). If you have questions about a medical condition or this instruction, always ask your healthcare professional. [address] any warranty or liability for your use of this information.

## 2018-04-19 LAB
25(OH)D3+25(OH)D2 SERPL-MCNC: 54.8 NG/ML (ref 30–100)
ALBUMIN SERPL-MCNC: 4.2 G/DL (ref 3.6–4.8)
ALBUMIN/GLOB SERPL: 1.8 {RATIO} (ref 1.2–2.2)
ALP SERPL-CCNC: 67 IU/L (ref 39–117)
ALT SERPL-CCNC: 26 IU/L (ref 0–32)
AST SERPL-CCNC: 38 IU/L (ref 0–40)
BILIRUB SERPL-MCNC: <0.2 MG/DL (ref 0–1.2)
BUN SERPL-MCNC: 17 MG/DL (ref 8–27)
BUN/CREAT SERPL: 22 (ref 12–28)
CALCIUM SERPL-MCNC: 9.7 MG/DL (ref 8.7–10.3)
CHLORIDE SERPL-SCNC: 104 MMOL/L (ref 96–106)
CHOLEST SERPL-MCNC: 144 MG/DL (ref 100–199)
CO2 SERPL-SCNC: 26 MMOL/L (ref 18–29)
CREAT SERPL-MCNC: 0.77 MG/DL (ref 0.57–1)
GFR SERPLBLD CREATININE-BSD FMLA CKD-EPI: 82 ML/MIN/1.73
GFR SERPLBLD CREATININE-BSD FMLA CKD-EPI: 94 ML/MIN/1.73
GLOBULIN SER CALC-MCNC: 2.3 G/DL (ref 1.5–4.5)
GLUCOSE SERPL-MCNC: 94 MG/DL (ref 65–99)
HBA1C MFR BLD: 5.9 % (ref 4.8–5.6)
HDLC SERPL-MCNC: 71 MG/DL
INTERPRETATION, 910389: NORMAL
LDLC SERPL CALC-MCNC: 59 MG/DL (ref 0–99)
POTASSIUM SERPL-SCNC: 4.4 MMOL/L (ref 3.5–5.2)
PROT SERPL-MCNC: 6.5 G/DL (ref 6–8.5)
SODIUM SERPL-SCNC: 142 MMOL/L (ref 134–144)
TRIGL SERPL-MCNC: 71 MG/DL (ref 0–149)
TSH SERPL DL<=0.005 MIU/L-ACNC: 3.93 UIU/ML (ref 0.45–4.5)
VLDLC SERPL CALC-MCNC: 14 MG/DL (ref 5–40)

## 2018-07-24 DIAGNOSIS — M54.50 RECURRENT LOW BACK PAIN: Primary | ICD-10-CM

## 2018-07-25 ENCOUNTER — HOSPITAL ENCOUNTER (OUTPATIENT)
Dept: GENERAL RADIOLOGY | Age: 63
Discharge: HOME | End: 2018-07-25
Attending: INTERNAL MEDICINE
Payer: COMMERCIAL

## 2018-07-25 DIAGNOSIS — M54.50 RECURRENT LOW BACK PAIN: ICD-10-CM

## 2018-07-25 PROCEDURE — 72110 X-RAY EXAM L-2 SPINE 4/>VWS: CPT

## 2018-07-25 PROCEDURE — 72202 X-RAY EXAM SI JOINTS 3/> VWS: CPT

## 2018-10-17 ENCOUNTER — OFFICE VISIT (OUTPATIENT)
Dept: FAMILY MEDICINE CLINIC | Age: 63
End: 2018-10-17

## 2018-10-17 VITALS
HEART RATE: 78 BPM | RESPIRATION RATE: 17 BRPM | SYSTOLIC BLOOD PRESSURE: 119 MMHG | HEIGHT: 64 IN | DIASTOLIC BLOOD PRESSURE: 78 MMHG | BODY MASS INDEX: 28.31 KG/M2 | WEIGHT: 165.8 LBS | TEMPERATURE: 97.9 F | OXYGEN SATURATION: 99 %

## 2018-10-17 DIAGNOSIS — E66.3 OVERWEIGHT (BMI 25.0-29.9): ICD-10-CM

## 2018-10-17 DIAGNOSIS — E55.9 VITAMIN D DEFICIENCY: ICD-10-CM

## 2018-10-17 DIAGNOSIS — M85.80 OSTEOPENIA, UNSPECIFIED LOCATION: ICD-10-CM

## 2018-10-17 DIAGNOSIS — E78.5 HYPERLIPIDEMIA, UNSPECIFIED HYPERLIPIDEMIA TYPE: Primary | ICD-10-CM

## 2018-10-17 DIAGNOSIS — E03.4 HYPOTHYROIDISM DUE TO ACQUIRED ATROPHY OF THYROID: ICD-10-CM

## 2018-10-17 DIAGNOSIS — E16.1 HYPERINSULINEMIA: ICD-10-CM

## 2018-10-17 NOTE — PROGRESS NOTES
Chief Complaint Patient presents with  Follow Up Chronic Condition 6 month; patient is fasting Pt is a 58y.o. year old female who presents for follow up of her chronic medical problems Health Maintenance Due Topic Date Due  Influenza Age 5 to Adult  08/01/2018-done  Shingrix Vaccine Age 50> (2 of 2) 09/02/2018-1st one done  COLONOSCOPY  03/13/2019-Dr Cutler BMI 28 Wt Readings from Last 3 Encounters:  
10/17/18 165 lb 12.8 oz (75.2 kg) 04/18/18 160 lb (72.6 kg) 10/11/17 152 lb (68.9 kg)  
has not complied with her diet recently but will get back to it Fasting today for labs Compliant with Crestor Right wrist fracture-occasional pain, cracking Osteopenia-Dexa due Per rheum, tx depends on what shows with this next one; ?fragility fracture of most recent wrist fracture Still on Plaquenil for her inflammatory arthritis of the hands Lab Results Component Value Date/Time TSH 3.930 04/18/2018 12:00 AM  
 TSH 3.57 04/22/2015 09:00 AM  
 
 
 
 
 
 
ROS: 
 
Pt denies: Wt loss, Fever/Chills, HA, Visual changes, Fatigue, Chest pain, SOB, ROBERTS, Abd pain, N/V/D/C, Blood in stool or urine, Edema. Pertinent positive as above in HPI. All others were negative Patient Active Problem List  
Diagnosis Code  Hyperlipidemia E78.5  Hyperinsulinemia E16.1  Vitamin D deficiency E55.9  Inflammatory osteoarthritis M19.90  
 Colon polyp K63.5  Family history of osteoporosis Z82.62  
 Hypothyroidism due to acquired atrophy of thyroid E03.4  Osteopenia M85.80  Advance directive discussed with patient Z70.80  
 Gastroesophageal reflux disease K21.9  Overweight (BMI 25.0-29. 9) E66.3 Past Medical History:  
Diagnosis Date  Anemia NEC   
 as a young child  Arthritis  GERD (gastroesophageal reflux disease)  Hypercholesterolemia  Hypothyroidism due to acquired atrophy of thyroid 4/22/2015 Current Outpatient Medications Medication Sig Dispense Refill  rosuvastatin (CRESTOR) 20 mg tablet Take 1 Tab by mouth nightly. 90 Tab 3  
 estradiol (VAGIFEM) 10 mcg tab vaginal tablet Insert 1 Tab into vagina daily. 90 Tab 3  
 ranitidine (ZANTAC) 150 mg tablet Take 150 mg by mouth two (2) times a day.  hydroxychloroquine (PLAQUENIL) 200 mg tablet Take 400 mg by mouth daily.  cholecalciferol, vitamin D3, (VITAMIN D3) 2,000 unit Tab Take  by mouth.  vitamin E (AQUA GEMS) 400 unit capsule Take  by mouth daily. Take half tab daily  loratadine (CLARITIN) 10 mg tablet Take 10 mg by mouth daily.  multivitamin (ONE A DAY) tablet Take 1 Tab by mouth daily.  acetaminophen (TYLENOL) 325 mg tablet Take  by mouth every four (4) hours as needed. Social History Tobacco Use Smoking Status Never Smoker Smokeless Tobacco Never Used Allergies Allergen Reactions  Iodinated Contrast- Oral And Iv Dye Other (comments) Severe overall skin reaction  Prilosec [Omeprazole Magnesium] Nausea Only Patient Labs were reviewed: yes Patient Past Records were reviewed:  yes Objective:  
 
Vitals:  
 10/17/18 0840 BP: 119/78 Pulse: 78 Resp: 17 Temp: 97.9 °F (36.6 °C) TempSrc: Oral  
SpO2: 99% Weight: 165 lb 12.8 oz (75.2 kg) Height: 5' 4\" (1.626 m) Body mass index is 28.46 kg/m². Exam:  
Appearance: alert, well appearing,  oriented to person, place, and time, acyanotic, in no respiratory distress and well hydrated. HEENT:  NC/AT, pink conj, anicteric sclerae Neck:  No cervical lymphadenopathy, no JVD, no thyromegaly, no carotid bruit Heart:  RRR without M/R/G Lungs:  CTAB, no rhonchi, rales, or wheezes with good air exchange Abdomen:  Non-tender, pos bowel sounds, no hepatosplenomegaly Ext:  No C/C/E, joint swelling on the right little finger and DIP of several fingers on both hands Skin: no rash Neuro: no lateralizing signs, CNs II-XII intact Assessment/ Plan:  
Diagnoses and all orders for this visit: Hyperlipidemia, unspecified hyperlipidemia type-continue with Crestor -     METABOLIC PANEL, COMPREHENSIVE; Future -     LIPID PANEL; Future Hypothyroidism due to acquired atrophy of thyroid-will continue to monitor TSH at this time 
-     TSH 3RD GENERATION; Future Osteopenia, unspecified location-continue with daily Ca+d, weight bearing exercises -     DEXA BONE DENSITY STUDY AXIAL; Future Vitamin D deficiency-on OTC Vit D 
-     VITAMIN D, 25 HYDROXY; Future Hyperinsulinemia-continue with efforts at weight loss -     METABOLIC PANEL, COMPREHENSIVE; Future 
-     HEMOGLOBIN A1C WITH EAG; Future Overweight-continue with efforts at weight loss by diet and exercise Follow-up Disposition: 
Return in about 6 months (around 4/17/2019) for follow up. I have discussed the diagnosis with the patient and the intended plan as seen in the above orders. The patient has received an After-Visit Summary and questions were answered concerning future plans. Medication Side Effects and Warnings were discussed with patient: yes Patient verbalized understanding of above instructions. Kia Alvarez MD 
Internal Medicine [facility]

## 2018-10-17 NOTE — PATIENT INSTRUCTIONS
Learning About Osteopenia What is osteopenia? Osteopenia is a decrease in thickness, or density, in bones. That means the bones become thinner and weaker. It is much more common in women than in men. It is an early form of osteoporosis, a condition in which the bones are so thin and weak that they can break easily. It's important to know that osteopenia is not a disease. It can happen normally with aging. Having osteopenia means that there is a greater risk that you may get osteoporosis. It also means that you are more likely to break a bone than someone who does not have osteopenia. But not everyone with osteopenia gets osteoporosis or breaks a bone. Osteopenia doesn't cause any symptoms. It's usually found with a type of X-ray called a bone density test. Osteopenia means that your bone density result (T-score) is between 1.0 and 2.5. What increases the risk for osteopenia? Things that increase your risk include: 
· Having a family history of osteoporosis. · Being thin. · Being white or . · Getting too little physical activity. · Smoking. · Drinking too much alcohol often. · Using certain medicines such as steroids. How can you prevent osteoporosis? There are things you can do to slow down osteopenia and prevent osteoporosis. Certain lifestyle changes will help slow the loss of bone density. · Eat food that has plenty of calcium and vitamin D. Yogurt, cheese, milk, and dark green vegetables are high in calcium. Eggs, fatty fish, cereal, and fortified milk are high in vitamin D. 
· Talk to your doctor about taking a calcium supplement that has vitamin D in it. · Get regular exercise. ? Do 30 minutes of weight-bearing exercise on most days of the week. Walking, jogging, stair climbing, and dancing are good choices. ? Do resistance exercises with weights or elastic bands 2 or 3 days a week. · Limit alcohol to 2 drinks a day for men and 1 drink a day for women.  Too much alcohol can cause health problems. · Do not smoke. Smoking can make bones thin faster. If you need help quitting, talk to your doctor about stop-smoking programs and medicines. These can increase your chances of quitting for good. Prescription medicines are available for treating bone thinning. But these are more often used to treat osteoporosis. Follow-up care is a key part of your treatment and safety. Be sure to make and go to all appointments, and call your doctor if you are having problems. It's also a good idea to know your test results and keep a list of the medicines you take. Where can you learn more? Go to http://carina-alec.info/. Enter X669 in the search box to learn more about \"Learning About Osteopenia. \" Current as of: March 16, 2018 Content Version: 11.8 © 5396-7254 Healthwise, Incorporated. Care instructions adapted under license by [employer] (which disclaims liability or warranty for this information). If you have questions about a medical condition or this instruction, always ask your healthcare professional. [address] any warranty or liability for your use of this information.

## 2018-10-18 LAB
25(OH)D3 SERPL-MCNC: 42 NG/ML (ref 30–100)
ALB/GLOBRATIO, 58C: 1.7 (CALC) (ref 1–2.5)
ALBUMIN SERPL-MCNC: 4 G/DL (ref 3.6–5.1)
ALP SERPL-CCNC: 70 U/L (ref 33–130)
ALT SERPL-CCNC: 25 U/L (ref 6–29)
AST SERPL W P-5'-P-CCNC: 30 U/L (ref 10–35)
BILIRUB SERPL-MCNC: 0.3 MG/DL (ref 0.2–1.2)
BUN SERPL-MCNC: 17 MG/DL (ref 7–25)
BUN/CREATININE RATIO,BUCR: NORMAL (CALC) (ref 6–22)
CALCIUM SERPL-MCNC: 9.6 MG/DL (ref 8.6–10.4)
CHLORIDE SERPL-SCNC: 107 MMOL/L (ref 98–110)
CHOL/HDL RATIO,CHHDX: 2.6 (CALC)
CHOLEST SERPL-MCNC: 187 MG/DL
CO2 SERPL-SCNC: 27 MMOL/L (ref 20–32)
CREAT SERPL-MCNC: 0.8 MG/DL (ref 0.5–0.99)
GLOBULIN,GLOB: 2.3 G/DL (CALC) (ref 1.9–3.7)
GLUCOSE SERPL-MCNC: 98 MG/DL (ref 65–99)
HBA1C MFR BLD HPLC: 5.7 % OF TOTAL HGB
HDLC SERPL-MCNC: 71 MG/DL
LDL-CHOLESTEROL: 96 MG/DL (CALC)
NON-HDL CHOLESTEROL, 011976: 116 MG/DL (CALC)
POTASSIUM SERPL-SCNC: 4.4 MMOL/L (ref 3.5–5.3)
PROT SERPL-MCNC: 6.3 G/DL (ref 6.1–8.1)
SODIUM SERPL-SCNC: 142 MMOL/L (ref 135–146)
TRIGL SERPL-MCNC: 108 MG/DL (ref ?–150)
TSH SERPL DL<=0.005 MIU/L-ACNC: 3.16 MIU/L (ref 0.4–4.5)

## 2018-11-21 ENCOUNTER — HOSPITAL ENCOUNTER (OUTPATIENT)
Dept: GENERAL RADIOLOGY | Age: 63
Discharge: HOME | End: 2018-11-21
Attending: INTERNAL MEDICINE
Payer: COMMERCIAL

## 2018-11-21 DIAGNOSIS — Z78.0 POST-MENOPAUSAL: ICD-10-CM

## 2018-11-21 DIAGNOSIS — M85.80 OSTEOPENIA, UNSPECIFIED LOCATION: ICD-10-CM

## 2018-11-21 PROCEDURE — 77080 DXA BONE DENSITY AXIAL: CPT

## 2018-11-29 DIAGNOSIS — M85.80 OSTEOPENIA, UNSPECIFIED LOCATION: Primary | ICD-10-CM

## 2018-11-29 RX ORDER — IBANDRONATE SODIUM 150 MG/1
150 TABLET, FILM COATED ORAL
Qty: 3 TAB | Refills: 3 | Status: SHIPPED | OUTPATIENT
Start: 2018-11-29 | End: 2019-12-03 | Stop reason: ALTCHOICE

## 2018-12-03 DIAGNOSIS — E78.5 HYPERLIPIDEMIA, UNSPECIFIED HYPERLIPIDEMIA TYPE: ICD-10-CM

## 2018-12-04 RX ORDER — ROSUVASTATIN CALCIUM 20 MG/1
20 TABLET, COATED ORAL
Qty: 90 TAB | Refills: 3 | Status: SHIPPED | OUTPATIENT
Start: 2018-12-04 | End: 2020-01-23 | Stop reason: SDUPTHER

## 2019-04-10 ENCOUNTER — OFFICE VISIT (OUTPATIENT)
Dept: FAMILY MEDICINE CLINIC | Age: 64
End: 2019-04-10

## 2019-04-10 ENCOUNTER — HOSPITAL ENCOUNTER (OUTPATIENT)
Dept: GENERAL RADIOLOGY | Age: 64
Discharge: HOME | End: 2019-04-10
Payer: COMMERCIAL

## 2019-04-10 VITALS
RESPIRATION RATE: 17 BRPM | SYSTOLIC BLOOD PRESSURE: 111 MMHG | OXYGEN SATURATION: 99 % | WEIGHT: 163 LBS | DIASTOLIC BLOOD PRESSURE: 71 MMHG | BODY MASS INDEX: 27.83 KG/M2 | HEIGHT: 64 IN | HEART RATE: 80 BPM | TEMPERATURE: 97.6 F

## 2019-04-10 DIAGNOSIS — M25.50 PAIN IN JOINT INVOLVING MULTIPLE SITES: ICD-10-CM

## 2019-04-10 DIAGNOSIS — E55.9 VITAMIN D DEFICIENCY: ICD-10-CM

## 2019-04-10 DIAGNOSIS — H81.90 VESTIBULAR DIZZINESS: ICD-10-CM

## 2019-04-10 DIAGNOSIS — E03.4 HYPOTHYROIDISM DUE TO ACQUIRED ATROPHY OF THYROID: ICD-10-CM

## 2019-04-10 DIAGNOSIS — E78.5 HYPERLIPIDEMIA, UNSPECIFIED HYPERLIPIDEMIA TYPE: Primary | ICD-10-CM

## 2019-04-10 DIAGNOSIS — R06.09 DOE (DYSPNEA ON EXERTION): ICD-10-CM

## 2019-04-10 DIAGNOSIS — M85.80 OSTEOPENIA, UNSPECIFIED LOCATION: ICD-10-CM

## 2019-04-10 DIAGNOSIS — N95.2 ATROPHIC VAGINITIS: ICD-10-CM

## 2019-04-10 DIAGNOSIS — R53.83 FATIGUE, UNSPECIFIED TYPE: ICD-10-CM

## 2019-04-10 DIAGNOSIS — M19.90 INFLAMMATORY OSTEOARTHRITIS: ICD-10-CM

## 2019-04-10 PROCEDURE — 71046 X-RAY EXAM CHEST 2 VIEWS: CPT

## 2019-04-10 RX ORDER — ESTRADIOL 10 UG/1
10 INSERT VAGINAL
Qty: 24 TAB | Refills: 3 | Status: SHIPPED | OUTPATIENT
Start: 2019-04-12 | End: 2020-01-23 | Stop reason: SDUPTHER

## 2019-04-10 RX ORDER — ESTRADIOL 10 UG/1
10 INSERT VAGINAL DAILY
Qty: 90 TAB | Refills: 3 | Status: SHIPPED | OUTPATIENT
Start: 2019-04-10 | End: 2019-04-10 | Stop reason: SDUPTHER

## 2019-04-10 RX ORDER — FOLIC ACID 1 MG/1
TABLET ORAL DAILY
COMMUNITY
End: 2019-10-13

## 2019-04-10 NOTE — PROGRESS NOTES
Chief Complaint   Patient presents with    Follow-up     6 month; patient is fasting    Shortness of Breath     x3 months    Fatigue     x3 months    Dizziness     x3 months       Pt is a 71y.o. year old female who presents for follow up of her chronic medical problems/new concerns as above    Wt Readings from Last 3 Encounters:   04/10/19 163 lb (73.9 kg)   10/17/18 165 lb 12.8 oz (75.2 kg)   18 160 lb (72.6 kg)     Health Maintenance Due   Topic Date Due    Pneumococcal 65+ years (2 of 2 - PPSV23)-next visit as she is about to go on a trip 2019    COLONOSCOPY -scheduled May 10th with Dr Car Andrade 2019   Had fever with Shingrix    Fasting today for labs    Dizzy spells that come on at any time, fleeting-feeling of movement/spinning  ?stress related  No headaches  No ear sxs    Very fatigued as well  Not been able to exercise as she used to  SOB on exertion which is new    Started on Mtx for her hand arthritis by Dr Darlyn Velez 2 months ago-?cause of the above sxs  Not helping her arthritis anyway so she stopped it this week and has a follow up shortly with Dr Darlyn Velez    ROS:    Pt denies: Wt loss, Fever/Chills, HA, Visual changes, Fatigue, Chest pain, SOB, ROBERTS, Abd pain, N/V/D/C, Blood in stool or urine, Edema. Pertinent positive as above in HPI. All others were negative    Patient Active Problem List   Diagnosis Code    Hyperlipidemia E78.5    Hyperinsulinemia E16.1    Vitamin D deficiency E55.9    Inflammatory osteoarthritis M19.90    Colon polyp K63.5    Family history of osteoporosis Z82.62    Hypothyroidism due to acquired atrophy of thyroid E03.4    Osteopenia M85.80    Advance directive discussed with patient Z70.80    Gastroesophageal reflux disease K21.9    Overweight (BMI 25.0-29. 9) E66.3       Past Medical History:   Diagnosis Date    Anemia NEC     as a young child    Arthritis     GERD (gastroesophageal reflux disease)     Hypercholesterolemia     Hypothyroidism due to acquired atrophy of thyroid 4/22/2015       Current Outpatient Medications   Medication Sig Dispense Refill    methotrexate (TREXALL) 7.5 mg tablet Take 7.5 mg by mouth every seven (7) days.  folic acid (FOLVITE) 1 mg tablet Take  by mouth daily.  estradiol (VAGIFEM) 10 mcg tab vaginal tablet Insert 1 Tab into vagina every Tuesday and Friday. 24 Tab 3    rosuvastatin (CRESTOR) 20 mg tablet Take 1 Tab by mouth nightly. 90 Tab 3    ibandronate (BONIVA) 150 mg tablet Take 150 mg by mouth every thirty (30) days. 3 Tab 3    ranitidine (ZANTAC) 150 mg tablet Take 150 mg by mouth two (2) times a day.  hydroxychloroquine (PLAQUENIL) 200 mg tablet Take 400 mg by mouth daily.  cholecalciferol, vitamin D3, (VITAMIN D3) 2,000 unit Tab Take  by mouth.  vitamin E (AQUA GEMS) 400 unit capsule Take  by mouth daily. Take half tab daily      loratadine (CLARITIN) 10 mg tablet Take 10 mg by mouth daily.  multivitamin (ONE A DAY) tablet Take 1 Tab by mouth daily.  acetaminophen (TYLENOL) 325 mg tablet Take  by mouth every four (4) hours as needed.  SULFASALAZINE PO 1,000 mg. Take 1 tablet by mouth twice daily         Social History     Tobacco Use   Smoking Status Never Smoker   Smokeless Tobacco Never Used       Allergies   Allergen Reactions    Azulfidine [Sulfasalazine] Rash    Iodinated Contrast- Oral And Iv Dye Other (comments)     Severe overall skin reaction    Prilosec [Omeprazole Magnesium] Nausea Only       Patient Labs were reviewed: yes      Patient Past Records were reviewed:  yes        Objective:     Vitals:    04/10/19 0842   BP: 111/71   Pulse: 80   Resp: 17   Temp: 97.6 °F (36.4 °C)   TempSrc: Oral   SpO2: 99%   Weight: 163 lb (73.9 kg)   Height: 5' 4\" (1.626 m)     Body mass index is 27.98 kg/m². Exam:   Appearance: alert, well appearing,  oriented to person, place, and time, acyanotic, in no respiratory distress and well hydrated.   HEENT:  NC/AT, pink conj, anicteric sclerae  Neck:  No cervical lymphadenopathy, no JVD, no thyromegaly, no carotid bruit  Heart:  RRR without M/R/G  Lungs:  CTAB, no rhonchi, rales, or wheezes with good air exchange   Abdomen:  Non-tender, pos bowel sounds, no hepatosplenomegaly  Ext:  No C/C/E, joint swelling on the PIP and DIP joints of the fingers, worse on the pinky fingers of both hands    Skin: no rash  Neuro: no lateralizing signs, CNs II-XII intact      Assessment/ Plan:   Diagnoses and all orders for this visit:    1. Hyperlipidemia, unspecified hyperlipidemia type -on Crestor  -     METABOLIC PANEL, COMPREHENSIVE; Future  -     LIPID PANEL; Future  -     HEMOGLOBIN A1C W/O EAG; Future  -     ECHO STRESS; Future    2. ROBERTS (dyspnea on exertion)-etio? Rule out CAD, Methotrexate side effects  -     CBC WITH AUTOMATED DIFF; Future  -     XR CHEST PA LAT; Future  -     ECHO STRESS; Future    3. Vestibular dizziness-?from the Methotrexate  -     CBC WITH AUTOMATED DIFF; Future    4. Fatigue, unspecified type-need to rule out possible other causes  -     VITAMIN B12; Future  -     FOLATE; Future  TSH    5. Pain in joint involving multiple sites-has never been tested for Lyme's and has had a lot of exposure  -     LYME AB, IGG & IGM BY WB; Future    6. Inflammatory osteoarthritis of the hands-currently on Plaquenil; Methotrexate added recently, rheum following  -     FOLATE; Future    7. Hypothyroidism due to acquired atrophy of thyroid-on on med  -     TSH 3RD GENERATION; Future    8. Vitamin D deficiency-on OTC Vit D  -     VITAMIN D, 25 HYDROXY; Future    9. Atrophic vaginitis-on 2x a week dosing now  -     estradiol (VAGIFEM) 10 mcg tab vaginal tablet; Insert 1 Tab into vagina every Tuesday and Friday.     10.Osteopenia -on Boniva; last Dexa done 2018 showed high FRAX score    Follow-up and Dispositions    · Return in about 6 months (around 10/10/2019) for follow up, physical.             I have discussed the diagnosis with the patient and the intended plan as seen in the above orders. The patient has received an After-Visit Summary and questions were answered concerning future plans. Medication Side Effects and Warnings were discussed with patient: yes    Patient verbalized understanding of above instructions.     Cas Jordan MD  Internal Medicine  [facility]

## 2019-04-10 NOTE — PATIENT INSTRUCTIONS
Dizziness: Care Instructions  Your Care Instructions  Dizziness is the feeling of unsteadiness or fuzziness in your head. It is different than having vertigo, which is a feeling that the room is spinning or that you are moving or falling. It is also different from lightheadedness, which is the feeling that you are about to faint. It can be hard to know what causes dizziness. Some people feel dizzy when they have migraine headaches. Sometimes bouts of flu can make you feel dizzy. Some medical conditions, such as heart problems or high blood pressure, can make you feel dizzy. Many medicines can cause dizziness, including medicines for high blood pressure, pain, or anxiety. If a medicine causes your symptoms, your doctor may recommend that you stop or change the medicine. If it is a problem with your heart, you may need medicine to help your heart work better. If there is no clear reason for your symptoms, your doctor may suggest watching and waiting for a while to see if the dizziness goes away on its own. Follow-up care is a key part of your treatment and safety. Be sure to make and go to all appointments, and call your doctor if you are having problems. It's also a good idea to know your test results and keep a list of the medicines you take. How can you care for yourself at home? · If your doctor recommends or prescribes medicine, take it exactly as directed. Call your doctor if you think you are having a problem with your medicine. · Do not drive while you feel dizzy. · Try to prevent falls. Steps you can take include:  ? Using nonskid mats, adding grab bars near the tub, and using night-lights. ? Clearing your home so that walkways are free of anything you might trip on.  ? Letting family and friends know that you have been feeling dizzy. This will help them know how to help you. When should you call for help? Call 911 anytime you think you may need emergency care.  For example, call if:    · You passed out (lost consciousness).     · You have dizziness along with symptoms of a heart attack. These may include:  ? Chest pain or pressure, or a strange feeling in the chest.  ? Sweating. ? Shortness of breath. ? Nausea or vomiting. ? Pain, pressure, or a strange feeling in the back, neck, jaw, or upper belly or in one or both shoulders or arms. ? Lightheadedness or sudden weakness. ? A fast or irregular heartbeat.     · You have symptoms of a stroke. These may include:  ? Sudden numbness, tingling, weakness, or loss of movement in your face, arm, or leg, especially on only one side of your body. ? Sudden vision changes. ? Sudden trouble speaking. ? Sudden confusion or trouble understanding simple statements. ? Sudden problems with walking or balance. ? A sudden, severe headache that is different from past headaches.    Call your doctor now or seek immediate medical care if:    · You feel dizzy and have a fever, headache, or ringing in your ears.     · You have new or increased nausea and vomiting.     · Your dizziness does not go away or comes back.    Watch closely for changes in your health, and be sure to contact your doctor if:    · You do not get better as expected. Where can you learn more? Go to http://carina-alec.info/. Enter X666 in the search box to learn more about \"Dizziness: Care Instructions. \"  Current as of: September 23, 2018  Content Version: 11.9  © 0920-2414 [employer]. Care instructions adapted under license by [employer] (which disclaims liability or warranty for this information). If you have questions about a medical condition or this instruction, always ask your healthcare professional. [place] [address] any warranty or liability for your use of this information.

## 2019-04-10 NOTE — PROGRESS NOTES
Chief Complaint   Patient presents with    Follow-up     6 month; patient is fasting    Shortness of Breath     x3 months    Fatigue     x3 months    Dizziness     x3 months       1. Have you been to the ER, urgent care clinic since your last visit? Hospitalized since your last visit? No    2. Have you seen or consulted any other health care providers outside of the [address] [place] [address] since your last visit? Include any pap smears or colon screening.  Yes Where: Dr. Antoine-R[facility]

## 2019-04-12 LAB
25(OH)D3+25(OH)D2 SERPL-MCNC: 45.2 NG/ML (ref 30–100)
ALBUMIN SERPL-MCNC: 4.3 G/DL (ref 3.6–4.8)
ALBUMIN/GLOB SERPL: 2.3 {RATIO} (ref 1.2–2.2)
ALP SERPL-CCNC: 64 IU/L (ref 39–117)
ALT SERPL-CCNC: 27 IU/L (ref 0–32)
AST SERPL-CCNC: 36 IU/L (ref 0–40)
B BURGDOR IGG PATRN SER IB-IMP: NEGATIVE
B BURGDOR IGM PATRN SER IB-IMP: NEGATIVE
B BURGDOR18KD IGG SER QL IB: NORMAL
B BURGDOR23KD IGG SER QL IB: NORMAL
B BURGDOR23KD IGM SER QL IB: NORMAL
B BURGDOR28KD IGG SER QL IB: NORMAL
B BURGDOR30KD IGG SER QL IB: NORMAL
B BURGDOR39KD IGG SER QL IB: NORMAL
B BURGDOR39KD IGM SER QL IB: NORMAL
B BURGDOR41KD IGG SER QL IB: NORMAL
B BURGDOR41KD IGM SER QL IB: NORMAL
B BURGDOR45KD IGG SER QL IB: NORMAL
B BURGDOR58KD IGG SER QL IB: NORMAL
B BURGDOR66KD IGG SER QL IB: NORMAL
B BURGDOR93KD IGG SER QL IB: NORMAL
BASOPHILS # BLD AUTO: 0.1 X10E3/UL (ref 0–0.2)
BASOPHILS NFR BLD AUTO: 1 %
BILIRUB SERPL-MCNC: 0.2 MG/DL (ref 0–1.2)
BUN SERPL-MCNC: 14 MG/DL (ref 8–27)
BUN/CREAT SERPL: 12 (ref 12–28)
CALCIUM SERPL-MCNC: 9.7 MG/DL (ref 8.7–10.3)
CHLORIDE SERPL-SCNC: 104 MMOL/L (ref 96–106)
CHOLEST SERPL-MCNC: 134 MG/DL (ref 100–199)
CO2 SERPL-SCNC: 22 MMOL/L (ref 20–29)
CREAT SERPL-MCNC: 1.15 MG/DL (ref 0.57–1)
EOSINOPHIL # BLD AUTO: 0.2 X10E3/UL (ref 0–0.4)
EOSINOPHIL NFR BLD AUTO: 2 %
ERYTHROCYTE [DISTWIDTH] IN BLOOD BY AUTOMATED COUNT: 16.5 % (ref 12.3–15.4)
FOLATE SERPL-MCNC: >20 NG/ML
GLOBULIN SER CALC-MCNC: 1.9 G/DL (ref 1.5–4.5)
GLUCOSE SERPL-MCNC: 97 MG/DL (ref 65–99)
HBA1C MFR BLD: 5.8 % (ref 4.8–5.6)
HCT VFR BLD AUTO: 39.5 % (ref 34–46.6)
HDLC SERPL-MCNC: 54 MG/DL
HGB BLD-MCNC: 12.1 G/DL (ref 11.1–15.9)
IMM GRANULOCYTES # BLD AUTO: 0 X10E3/UL (ref 0–0.1)
IMM GRANULOCYTES NFR BLD AUTO: 0 %
INTERPRETATION, 910389: NORMAL
INTERPRETATION: NORMAL
LDLC SERPL CALC-MCNC: 62 MG/DL (ref 0–99)
LYMPHOCYTES # BLD AUTO: 2.1 X10E3/UL (ref 0.7–3.1)
LYMPHOCYTES NFR BLD AUTO: 31 %
MCH RBC QN AUTO: 25.7 PG (ref 26.6–33)
MCHC RBC AUTO-ENTMCNC: 30.6 G/DL (ref 31.5–35.7)
MCV RBC AUTO: 84 FL (ref 79–97)
MONOCYTES # BLD AUTO: 0.5 X10E3/UL (ref 0.1–0.9)
MONOCYTES NFR BLD AUTO: 8 %
NEUTROPHILS # BLD AUTO: 4 X10E3/UL (ref 1.4–7)
NEUTROPHILS NFR BLD AUTO: 58 %
PDF IMAGE, 910387: NORMAL
PLATELET # BLD AUTO: 253 X10E3/UL (ref 150–379)
POTASSIUM SERPL-SCNC: 4.6 MMOL/L (ref 3.5–5.2)
PROT SERPL-MCNC: 6.2 G/DL (ref 6–8.5)
RBC # BLD AUTO: 4.71 X10E6/UL (ref 3.77–5.28)
SODIUM SERPL-SCNC: 142 MMOL/L (ref 134–144)
TRIGL SERPL-MCNC: 92 MG/DL (ref 0–149)
TSH SERPL DL<=0.005 MIU/L-ACNC: 2.81 UIU/ML (ref 0.45–4.5)
VIT B12 SERPL-MCNC: 600 PG/ML (ref 232–1245)
VLDLC SERPL CALC-MCNC: 18 MG/DL (ref 5–40)
WBC # BLD AUTO: 6.9 X10E3/UL (ref 3.4–10.8)

## 2019-05-08 ENCOUNTER — HOSPITAL ENCOUNTER (OUTPATIENT)
Dept: NON INVASIVE DIAGNOSTICS | Age: 64
Discharge: HOME | End: 2019-05-08
Attending: INTERNAL MEDICINE
Payer: COMMERCIAL

## 2019-05-08 VITALS
SYSTOLIC BLOOD PRESSURE: 113 MMHG | WEIGHT: 163 LBS | BODY MASS INDEX: 27.83 KG/M2 | HEIGHT: 64 IN | DIASTOLIC BLOOD PRESSURE: 61 MMHG

## 2019-05-08 DIAGNOSIS — R06.09 DOE (DYSPNEA ON EXERTION): ICD-10-CM

## 2019-05-08 DIAGNOSIS — E78.5 HYPERLIPIDEMIA, UNSPECIFIED HYPERLIPIDEMIA TYPE: ICD-10-CM

## 2019-05-08 LAB
STRESS ANGINA INDEX: 0
STRESS BASELINE HR: 83 BPM
STRESS ESTIMATED WORKLOAD: 7 METS
STRESS EXERCISE DUR MIN: NORMAL
STRESS PEAK DIAS BP: 80 MMHG
STRESS PEAK SYS BP: 124 MMHG
STRESS PERCENT HR ACHIEVED: 106 %
STRESS POST PEAK HR: 164 BPM
STRESS RATE PRESSURE PRODUCT: NORMAL BPM*MMHG
STRESS SR DUKE TREADMILL SCORE: 0
STRESS ST DEPRESSION: 0 MM
STRESS ST ELEVATION: 0 MM
STRESS TARGET HR: 155 BPM

## 2019-05-08 PROCEDURE — 93351 STRESS TTE COMPLETE: CPT

## 2019-05-30 DIAGNOSIS — N18.9 CHRONIC KIDNEY DISEASE, UNSPECIFIED CKD STAGE: Primary | ICD-10-CM

## 2019-05-31 ENCOUNTER — HOSPITAL ENCOUNTER (OUTPATIENT)
Dept: LAB | Age: 64
Discharge: HOME | End: 2019-05-31
Payer: COMMERCIAL

## 2019-05-31 DIAGNOSIS — N18.9 CHRONIC KIDNEY DISEASE, UNSPECIFIED CKD STAGE: ICD-10-CM

## 2019-05-31 LAB
ANION GAP SERPL CALC-SCNC: 9 MMOL/L (ref 3–18)
BUN SERPL-MCNC: 17 MG/DL (ref 7–18)
BUN/CREAT SERPL: 20 (ref 12–20)
CALCIUM SERPL-MCNC: 9.4 MG/DL (ref 8.5–10.1)
CHLORIDE SERPL-SCNC: 106 MMOL/L (ref 100–108)
CO2 SERPL-SCNC: 27 MMOL/L (ref 21–32)
CREAT SERPL-MCNC: 0.86 MG/DL (ref 0.6–1.3)
GLUCOSE SERPL-MCNC: 97 MG/DL (ref 74–99)
POTASSIUM SERPL-SCNC: 4.2 MMOL/L (ref 3.5–5.5)
SODIUM SERPL-SCNC: 142 MMOL/L (ref 136–145)

## 2019-05-31 PROCEDURE — 36415 COLL VENOUS BLD VENIPUNCTURE: CPT

## 2019-05-31 PROCEDURE — 80048 BASIC METABOLIC PNL TOTAL CA: CPT

## 2019-10-09 ENCOUNTER — OFFICE VISIT (OUTPATIENT)
Dept: FAMILY MEDICINE CLINIC | Age: 64
End: 2019-10-09

## 2019-10-09 VITALS
DIASTOLIC BLOOD PRESSURE: 75 MMHG | HEIGHT: 64 IN | WEIGHT: 166.2 LBS | SYSTOLIC BLOOD PRESSURE: 124 MMHG | TEMPERATURE: 97.9 F | BODY MASS INDEX: 28.38 KG/M2 | OXYGEN SATURATION: 97 % | HEART RATE: 81 BPM | RESPIRATION RATE: 17 BRPM

## 2019-10-09 DIAGNOSIS — M19.90 INFLAMMATORY OSTEOARTHRITIS: ICD-10-CM

## 2019-10-09 DIAGNOSIS — E55.9 VITAMIN D DEFICIENCY: ICD-10-CM

## 2019-10-09 DIAGNOSIS — E16.1 HYPERINSULINEMIA: ICD-10-CM

## 2019-10-09 DIAGNOSIS — K21.9 GASTROESOPHAGEAL REFLUX DISEASE, ESOPHAGITIS PRESENCE NOT SPECIFIED: ICD-10-CM

## 2019-10-09 DIAGNOSIS — E66.3 OVERWEIGHT (BMI 25.0-29.9): ICD-10-CM

## 2019-10-09 DIAGNOSIS — E03.4 HYPOTHYROIDISM DUE TO ACQUIRED ATROPHY OF THYROID: ICD-10-CM

## 2019-10-09 DIAGNOSIS — E78.5 HYPERLIPIDEMIA, UNSPECIFIED HYPERLIPIDEMIA TYPE: ICD-10-CM

## 2019-10-09 DIAGNOSIS — N95.2 ATROPHIC VAGINITIS: ICD-10-CM

## 2019-10-09 DIAGNOSIS — M85.80 OSTEOPENIA, UNSPECIFIED LOCATION: ICD-10-CM

## 2019-10-09 DIAGNOSIS — Z01.419 WELL WOMAN EXAM WITH ROUTINE GYNECOLOGICAL EXAM: Primary | ICD-10-CM

## 2019-10-09 RX ORDER — CALC/MAG/B COMPLEX/D3/HERB 61
TABLET ORAL
COMMUNITY
End: 2020-04-29 | Stop reason: ALTCHOICE

## 2019-10-09 NOTE — PROGRESS NOTES
Chief Complaint   Patient presents with    Complete Physical     Patient is fasting; with PAP       Pt is a 71y.o. year old female who presents for her annual wellness visit    Health Maintenance Due   Topic Date Due    Pneumococcal 65+ years (2 of 2 - PPSV23) 01/07/2019     Rheum thinking about Humira for the inflammatory arthritis on her hands so needs PPD at some point (we do not have the reagent in the office and our lab does not do the Quantiferon test)  Off Methotrexate-had a lot of side effects including dizziness and lab abnormalities    Had flu shot at work    Compliant with all meds    mammo normal done 8/2019  Dexa done 11/2018-osteopenia but has high FRAX score and has had ?fragility fracture  Colonoscopy is up to date  Already got shingles shot  Will get Pneumovax next visit    Fasting for labs today      ROS:    Pt denies: Wt loss, Fever/Chills, HA, Visual changes, Fatigue, Chest pain, SOB, ROBERTS, Abd pain, N/V/D/C, Blood in stool or urine, Edema. Pertinent positive as above in HPI. All others were negative    Patient Active Problem List   Diagnosis Code    Hyperlipidemia E78.5    Hyperinsulinemia E16.1    Vitamin D deficiency E55.9    Inflammatory osteoarthritis M19.90    Colon polyp K63.5    Family history of osteoporosis Z82.62    Hypothyroidism due to acquired atrophy of thyroid E03.4    Osteopenia M85.80    Advance directive discussed with patient Z70.80    Gastroesophageal reflux disease K21.9    Overweight (BMI 25.0-29. 9) E66.3       Past Medical History:   Diagnosis Date    Anemia NEC     as a young child    Arthritis     GERD (gastroesophageal reflux disease)     Hypercholesterolemia     Hypothyroidism due to acquired atrophy of thyroid 4/22/2015       Current Outpatient Medications   Medication Sig Dispense Refill    lansoprazole (PREVACID) 15 mg capsule Take  by mouth Daily (before breakfast).       estradiol (VAGIFEM) 10 mcg tab vaginal tablet Insert 1 Tab into vagina every Tuesday and Friday. 24 Tab 3    rosuvastatin (CRESTOR) 20 mg tablet Take 1 Tab by mouth nightly. 90 Tab 3    ibandronate (BONIVA) 150 mg tablet Take 150 mg by mouth every thirty (30) days. 3 Tab 3    hydroxychloroquine (PLAQUENIL) 200 mg tablet Take 400 mg by mouth daily.  cholecalciferol, vitamin D3, (VITAMIN D3) 2,000 unit Tab Take  by mouth.  vitamin E (AQUA GEMS) 400 unit capsule Take  by mouth daily. Take half tab daily      loratadine (CLARITIN) 10 mg tablet Take 10 mg by mouth daily.  multivitamin (ONE A DAY) tablet Take 1 Tab by mouth daily.  acetaminophen (TYLENOL) 325 mg tablet Take  by mouth every four (4) hours as needed. Social History     Tobacco Use   Smoking Status Never Smoker   Smokeless Tobacco Never Used       Allergies   Allergen Reactions    Azulfidine [Sulfasalazine] Rash    Arava [Leflunomide] Other (comments)     Elevated liver enzymes    Iodinated Contrast Media Other (comments)     Severe overall skin reaction    Prilosec [Omeprazole Magnesium] Nausea Only       Patient Labs were reviewed: yes      Patient Past Records were reviewed:  yes        Objective:     Vitals:    10/09/19 1012   BP: 124/75   Pulse: 81   Resp: 17   Temp: 97.9 °F (36.6 °C)   TempSrc: Oral   SpO2: 97%   Weight: 166 lb 3.2 oz (75.4 kg)   Height: 5' 4\" (1.626 m)     Body mass index is 28.53 kg/m². Exam:   Appearance: alert, well appearing,  oriented to person, place, and time, acyanotic, in no respiratory distress and well hydrated.   HEENT:  NC/AT, pink conj, anicteric sclerae  Neck:  No cervical lymphadenopathy, no JVD, no thyromegaly, no carotid bruit  Heart:  RRR without M/R/G  Lungs:  CTAB, no rhonchi, rales, or wheezes with good air exchange   Abdomen:  Non-tender, pos bowel sounds, no hepatosplenomegaly  Ext:  No C/C/E    Skin: no rash  Neuro: no lateralizing signs, CNs II-XII intact  Breast exam: no palpable masses bilaterally, no nipple discharge, no axillary adenopathy, no skin changes  Pelvic exam: NEG, on speculum exam cervix was visualized/some bleeding when PAP was done but otherwise no abnormalities noted; no AM/T    Assessment/ Plan:   Diagnoses and all orders for this visit:    1. Well woman exam with routine gynecological exam-Advised re: monthly self breast exam, dental prophylaxis Q 6 months, regular exercise, yearly eye exam, daily intake of Ca+D  -     CBC WITH AUTOMATED DIFF; Future  -     METABOLIC PANEL, COMPREHENSIVE; Future  -     PAP IG, APTIMA HPV AND RFX 16/18,45 ([id number]); Future  Pneumovax 23 next visit  Advanced care planning discussion next visit    2. Hyperlipidemia, unspecified hyperlipidemia type-on Crestor  -     LIPID PANEL; Future    3. Vitamin D deficiency-on OTC vit D  -     VITAMIN D, 25 HYDROXY; Future    4. Hypothyroidism due to acquired atrophy of thyroid-not on med, continue to monitor TSH  -     TSH 3RD GENERATION; Future    5. Hyperinsulinemia-continue with efforts at weight loss  -     HEMOGLOBIN A1C W/O EAG; Future    6. Inflammatory osteoarthritis-rheum following; may be trying Humira    7. Overweight (BMI 25.0-29. 9)-continue with efforts at weight loss by diet and exercise    8. Atrophic vaginitis-continue with Vagifem    9. Osteopenia but with high FRAX score-continue with Boniva    10. Gastroesophageal reflux disease, esophagitis presence not specified-on Prevacid        Follow-up and Dispositions    · Return in about 6 months (around 4/9/2020) for follow up. I have discussed the diagnosis with the patient and the intended plan as seen in the above orders. The patient has received an After-Visit Summary and questions were answered concerning future plans. Medication Side Effects and Warnings were discussed with patient: yes    Patient verbalized understanding of above instructions.     Joslyn Romberg, MD  Internal Medicine  [facility]

## 2019-10-09 NOTE — PROGRESS NOTES
Chief Complaint   Patient presents with    Complete Physical     Patient is fasting; with PAP     1. Have you been to the ER, urgent care clinic since your last visit? Hospitalized since your last visit? No    2. Have you seen or consulted any other health care providers outside of the [address] [place] [address] since your last visit? Include any pap smears or colon screening. Dr. Jody Alves    Patient completed influenza vaccine 10/8 at her work.

## 2019-10-09 NOTE — PATIENT INSTRUCTIONS
Well Visit, Women 48 to 72: Care Instructions Your Care Instructions Physical exams can help you stay healthy. Your doctor has checked your overall health and may have suggested ways to take good care of yourself. He or she also may have recommended tests. At home, you can help prevent illness with healthy eating, regular exercise, and other steps. Follow-up care is a key part of your treatment and safety. Be sure to make and go to all appointments, and call your doctor if you are having problems. It's also a good idea to know your test results and keep a list of the medicines you take. How can you care for yourself at home? · Reach and stay at a healthy weight. This will lower your risk for many problems, such as obesity, diabetes, heart disease, and high blood pressure. · Get at least 30 minutes of exercise on most days of the week. Walking is a good choice. You also may want to do other activities, such as running, swimming, cycling, or playing tennis or team sports. · Do not smoke. Smoking can make health problems worse. If you need help quitting, talk to your doctor about stop-smoking programs and medicines. These can increase your chances of quitting for good. · Protect your skin from too much sun. When you're outdoors from 10 a.m. to 4 p.m., stay in the shade or cover up with clothing and a hat with a wide brim. Wear sunglasses that block UV rays. Even when it's cloudy, put broad-spectrum sunscreen (SPF 30 or higher) on any exposed skin. · See a dentist one or two times a year for checkups and to have your teeth cleaned. · Wear a seat belt in the car. Follow your doctor's advice about when to have certain tests. These tests can spot problems early. · Cholesterol. Your doctor will tell you how often to have this done based on your age, family history, or other things that can increase your risk for heart attack and stroke. · Blood pressure. Have your blood pressure checked during a routine doctor visit. Your doctor will tell you how often to check your blood pressure based on your age, your blood pressure results, and other factors. · Mammogram. Ask your doctor how often you should have a mammogram, which is an X-ray of your breasts. A mammogram can spot breast cancer before it can be felt and when it is easiest to treat. · Pap test and pelvic exam. Ask your doctor how often you should have a Pap test. You may not need to have a Pap test as often as you used to. · Vision. Have your eyes checked every year or two or as often as your doctor suggests. Some experts recommend that you have yearly exams for glaucoma and other age-related eye problems starting at age 48. · Hearing. Tell your doctor if you notice any change in your hearing. You can have tests to find out how well you hear. · Diabetes. Ask your doctor whether you should have tests for diabetes. · Colorectal cancer. Your risk for colorectal cancer gets higher as you get older. Some experts say that adults should start regular screening at age 48 and stop at age 76. Others say to start before age 48 or continue after age 76. Talk with your doctor about your risk and when to start and stop screening. · Thyroid disease. Talk to your doctor about whether to have your thyroid checked as part of a regular physical exam. Women have an increased chance of a thyroid problem. · Osteoporosis. You should begin tests for bone density at age 72. If you are younger than 72, ask your doctor whether you have factors that may increase your risk for this disease. You may want to have this test before age 72. · Heart attack and stroke risk. At least every 4 to 6 years, you should have your risk for heart attack and stroke assessed.  Your doctor uses factors such as your age, blood pressure, cholesterol, and whether you smoke or have diabetes to show what your risk for a heart attack or stroke is over the next 10 years. When should you call for help? Watch closely for changes in your health, and be sure to contact your doctor if you have any problems or symptoms that concern you. Where can you learn more? Go to http://carina-alec.info/. Enter L600 in the search box to learn more about \"Well Visit, Women 50 to 72: Care Instructions. \" Current as of: December 13, 2018 Content Version: 12.2 © 0114-4741 [employer]. Care instructions adapted under license by [employer] (which disclaims liability or warranty for this information). If you have questions about a medical condition or this instruction, always ask your healthcare professional. [address] any warranty or liability for your use of this information.

## 2019-10-10 LAB
25(OH)D3+25(OH)D2 SERPL-MCNC: 41.1 NG/ML (ref 30–100)
ALBUMIN SERPL-MCNC: 4.6 G/DL (ref 3.6–4.8)
ALBUMIN/GLOB SERPL: 2.4 {RATIO} (ref 1.2–2.2)
ALP SERPL-CCNC: 62 IU/L (ref 39–117)
ALT SERPL-CCNC: 34 IU/L (ref 0–32)
AST SERPL-CCNC: 39 IU/L (ref 0–40)
BASOPHILS # BLD AUTO: 0 X10E3/UL (ref 0–0.2)
BASOPHILS NFR BLD AUTO: 1 %
BILIRUB SERPL-MCNC: 0.2 MG/DL (ref 0–1.2)
BUN SERPL-MCNC: 13 MG/DL (ref 8–27)
BUN/CREAT SERPL: 17 (ref 12–28)
CALCIUM SERPL-MCNC: 9.6 MG/DL (ref 8.7–10.3)
CHLORIDE SERPL-SCNC: 103 MMOL/L (ref 96–106)
CHOLEST SERPL-MCNC: 179 MG/DL (ref 100–199)
CO2 SERPL-SCNC: 23 MMOL/L (ref 20–29)
CREAT SERPL-MCNC: 0.77 MG/DL (ref 0.57–1)
EOSINOPHIL # BLD AUTO: 0.2 X10E3/UL (ref 0–0.4)
EOSINOPHIL NFR BLD AUTO: 3 %
ERYTHROCYTE [DISTWIDTH] IN BLOOD BY AUTOMATED COUNT: 14.6 % (ref 12.3–15.4)
GLOBULIN SER CALC-MCNC: 1.9 G/DL (ref 1.5–4.5)
GLUCOSE SERPL-MCNC: 87 MG/DL (ref 65–99)
HBA1C MFR BLD: 5.8 % (ref 4.8–5.6)
HCT VFR BLD AUTO: 40.5 % (ref 34–46.6)
HDLC SERPL-MCNC: 80 MG/DL
HGB BLD-MCNC: 13.2 G/DL (ref 11.1–15.9)
IMM GRANULOCYTES # BLD AUTO: 0 X10E3/UL (ref 0–0.1)
IMM GRANULOCYTES NFR BLD AUTO: 0 %
INTERPRETATION, 910389: NORMAL
LDLC SERPL CALC-MCNC: 83 MG/DL (ref 0–99)
LYMPHOCYTES # BLD AUTO: 1.7 X10E3/UL (ref 0.7–3.1)
LYMPHOCYTES NFR BLD AUTO: 29 %
MCH RBC QN AUTO: 28 PG (ref 26.6–33)
MCHC RBC AUTO-ENTMCNC: 32.6 G/DL (ref 31.5–35.7)
MCV RBC AUTO: 86 FL (ref 79–97)
MONOCYTES # BLD AUTO: 0.5 X10E3/UL (ref 0.1–0.9)
MONOCYTES NFR BLD AUTO: 9 %
NEUTROPHILS # BLD AUTO: 3.3 X10E3/UL (ref 1.4–7)
NEUTROPHILS NFR BLD AUTO: 58 %
PLATELET # BLD AUTO: 211 X10E3/UL (ref 150–450)
POTASSIUM SERPL-SCNC: 4.4 MMOL/L (ref 3.5–5.2)
PROT SERPL-MCNC: 6.5 G/DL (ref 6–8.5)
RBC # BLD AUTO: 4.71 X10E6/UL (ref 3.77–5.28)
SODIUM SERPL-SCNC: 143 MMOL/L (ref 134–144)
TRIGL SERPL-MCNC: 78 MG/DL (ref 0–149)
TSH SERPL DL<=0.005 MIU/L-ACNC: 2.84 UIU/ML (ref 0.45–4.5)
VLDLC SERPL CALC-MCNC: 16 MG/DL (ref 5–40)
WBC # BLD AUTO: 5.7 X10E3/UL (ref 3.4–10.8)

## 2019-10-11 ENCOUNTER — LAB ONLY (OUTPATIENT)
Dept: INTERNAL MEDICINE CLINIC | Age: 64
End: 2019-10-11

## 2019-10-11 DIAGNOSIS — Z11.1 SCREENING EXAMINATION FOR PULMONARY TUBERCULOSIS: ICD-10-CM

## 2019-10-11 DIAGNOSIS — Z23 ENCOUNTER FOR IMMUNIZATION: ICD-10-CM

## 2019-10-11 LAB
MM INDURATION POC: NORMAL (ref 0–5)
PPD POC: NORMAL

## 2019-10-11 NOTE — PROGRESS NOTES
PPD Placement note  Doni Ila, 71 y.o. female is here today for placement of PPD test  Reason for PPD test: other  Pt taken PPD test before: yes  Verified in allergy area and with patient that they are not allergic to the products PPD is made of (Phenol or Tween). Yes  Is patient taking any oral or IV steroid medication now or have they taken it in the last month? no  Has the patient ever received the BCG vaccine?: no  Has the patient been in recent contact with anyone known or suspected of having active TB disease?: no       Date of exposure (if applicable): N/A       Name of person they were exposed to (if applicable): N/A  Patient's Country of origin?: unknown  O: Alert and oriented in NAD. P:  PPD placed on 10/11/2019. Patient advised to return for reading within 48-72 hours.

## 2019-10-12 LAB
CYTOLOGIST CVX/VAG CYTO: NORMAL
CYTOLOGY CVX/VAG DOC CYTO: NORMAL
CYTOLOGY CVX/VAG DOC THIN PREP: NORMAL
DX ICD CODE: NORMAL
HPV I/H RISK 4 DNA CVX QL PROBE+SIG AMP: NEGATIVE
Lab: NORMAL
OTHER STN SPEC: NORMAL
STAT OF ADQ CVX/VAG CYTO-IMP: NORMAL

## 2019-12-03 DIAGNOSIS — M85.80 OSTEOPENIA, UNSPECIFIED LOCATION: Primary | ICD-10-CM

## 2019-12-03 RX ORDER — ALENDRONATE SODIUM 70 MG/1
70 TABLET ORAL
Qty: 12 TAB | Refills: 1 | Status: SHIPPED | OUTPATIENT
Start: 2019-12-03 | End: 2020-01-23 | Stop reason: SDUPTHER

## 2019-12-06 DIAGNOSIS — M25.532 LEFT WRIST PAIN: Primary | ICD-10-CM

## 2020-02-10 DIAGNOSIS — A09 TRAVELER'S DIARRHEA: ICD-10-CM

## 2020-02-10 DIAGNOSIS — Z29.8 NEED FOR MALARIA PROPHYLAXIS: Primary | ICD-10-CM

## 2020-02-10 RX ORDER — AZITHROMYCIN 250 MG/1
TABLET, FILM COATED ORAL
Qty: 6 TAB | Refills: 0 | Status: SHIPPED | OUTPATIENT
Start: 2020-02-10 | End: 2020-02-15

## 2020-02-10 RX ORDER — ATOVAQUONE AND PROGUANIL HYDROCHLORIDE 250; 100 MG/1; MG/1
TABLET, FILM COATED ORAL
Qty: 24 TAB | Refills: 0 | Status: SHIPPED | OUTPATIENT
Start: 2020-02-10 | End: 2020-04-29

## 2020-04-29 ENCOUNTER — VIRTUAL VISIT (OUTPATIENT)
Dept: FAMILY MEDICINE CLINIC | Age: 65
End: 2020-04-29

## 2020-04-29 DIAGNOSIS — M65.4 DE QUERVAIN'S TENOSYNOVITIS, LEFT: ICD-10-CM

## 2020-04-29 DIAGNOSIS — E78.5 HYPERLIPIDEMIA, UNSPECIFIED HYPERLIPIDEMIA TYPE: Primary | ICD-10-CM

## 2020-04-29 DIAGNOSIS — M19.90 INFLAMMATORY OSTEOARTHRITIS: ICD-10-CM

## 2020-04-29 DIAGNOSIS — K21.9 GASTROESOPHAGEAL REFLUX DISEASE, ESOPHAGITIS PRESENCE NOT SPECIFIED: ICD-10-CM

## 2020-04-29 DIAGNOSIS — E03.4 HYPOTHYROIDISM DUE TO ACQUIRED ATROPHY OF THYROID: ICD-10-CM

## 2020-04-29 DIAGNOSIS — M85.80 OSTEOPENIA, UNSPECIFIED LOCATION: ICD-10-CM

## 2020-04-29 DIAGNOSIS — E55.9 VITAMIN D DEFICIENCY: ICD-10-CM

## 2020-04-29 DIAGNOSIS — M70.62 TROCHANTERIC BURSITIS, LEFT HIP: ICD-10-CM

## 2020-04-29 DIAGNOSIS — R73.03 PREDIABETES: ICD-10-CM

## 2020-04-29 RX ORDER — PANTOPRAZOLE SODIUM 40 MG/1
40 TABLET, DELAYED RELEASE ORAL DAILY
Qty: 90 TAB | Refills: 1 | Status: SHIPPED | OUTPATIENT
Start: 2020-04-29 | End: 2020-11-03 | Stop reason: SDUPTHER

## 2020-04-29 NOTE — PROGRESS NOTES
Vivek Marcelino is a 76 y.o. female who was seen by synchronous (real-time) audio-video technology on 4/29/2020. Consent: Vivek Marcelino, who was seen by synchronous (real-time) audio-video technology, and/or her healthcare decision maker, is aware that this patient-initiated, Telehealth encounter on 4/29/2020 is a billable service, with coverage as determined by her insurance carrier. She is aware that she may receive a bill and has provided verbal consent to proceed: Yes. Assessment & Plan:   Diagnoses and all orders for this visit:    1. Hyperlipidemia, unspecified hyperlipidemia type-continue with Crestor  -     METABOLIC PANEL, COMPREHENSIVE; Future  -     LIPID PANEL; Future    2. Gastroesophageal reflux disease, esophagitis presence not specified-will change OTC Prevacid to Protonix for better efficacy  -     CBC WITH AUTOMATED DIFF; Future  -     pantoprazole (PROTONIX) 40 mg tablet; Take 1 Tab by mouth daily. 3. Hypothyroidism due to acquired atrophy of thyroid-currently not on meds  -     TSH 3RD GENERATION; Future    4. Osteopenia, unspecified location-continue with Fosamax ( family hx of osteoporosis and ?fragility fracture)    5. Vitamin D deficiency-on OTC vit D  -     VITAMIN D, 25 HYDROXY; Future    6. De Quervain's tenosynovitis, left-s/p injection by Dr Ronald Rodriguez; continue with wrist brace and home exercises    7. Trochanteric bursitis, left hip-on prn OTC NSAIDs    8. Inflammatory osteoarthritis-Rheum following    9. Prediabetes-continue with weight loss efforts  -     HEMOGLOBIN A1C W/O EAG; Future        Follow-up and Dispositions    · Return in about 6 months (around 10/29/2020) for follow up. 712  Subjective:    Vivek Marcelino is a 76 y.o. female who was seen for Follow Up Chronic Condition    Health Maintenance Due   Topic Date Due    Medicare Yearly Exam -does not have Medicare B yet 04/29/2020     Since last visit:  Off Humira currently for inflammatory arthritis on the hands bec of side effects  Plaquenil stopped working and had bad side effects on Methotrexate    Bout of De Quervain's on the left-injected by Dr Shonda Reddy after having done exercises; still with some pain/trying to avoid surgery at this time  Right handed  Wears wrist brace    trochanteric bursitis on the left hip-from exercising/going up and down [place] for exercise  Takes prn OTC NSAIDs    OTC Prevacid for GERD; wants Protonix 40 mg instead which worked for her before; has breakthrough sxs and needing to add antacids to Prevacid    Vitals at home today:  BP-107/72  97.0   after walking up steps  O2 98%  Did not weigh herself-felt that she has gained weight during this quarantine        A1c at home-6.2 (used the kit A1C now)  Lab Results   Component Value Date/Time    Hemoglobin A1c 5.8 (H) 10/09/2019 11:01 AM     Lab Results   Component Value Date/Time    Vitamin D 25-Hydroxy 36.8 04/12/2017 09:29 AM    VITAMIN D, 25-HYDROXY 41.1 10/09/2019 11:01 AM     On 5000u OTC Vit D    Lab Results   Component Value Date/Time    TSH 2.840 10/09/2019 11:01 AM    TSH 3.57 04/22/2015 09:00 AM   Not on meds    Osteopenia-needs follow up Dexa later in the year  Would like to avoid taking Prolia as much as possible    Prior to Admission medications    Medication Sig Start Date End Date Taking? Authorizing Provider   alendronate (FOSAMAX) 70 mg tablet Take 1 Tab by mouth every seven (7) days. 1/24/20  Yes Richard Garcia MD   estradioL (VAGIFEM) 10 mcg tab vaginal tablet Insert 1 Tab into vagina every Tuesday and Friday. 1/24/20  Yes Edith Sprague MD   rosuvastatin (CRESTOR) 20 mg tablet Take 1 Tab by mouth nightly. 1/24/20  Yes Edith Sprague MD   lansoprazole (PREVACID) 15 mg capsule Take  by mouth Daily (before breakfast). Yes Provider, Historical   cholecalciferol, vitamin D3, (VITAMIN D3) 2,000 unit Tab Take  by mouth.    Yes Provider, Historical   vitamin E (AQUA GEMS) 400 unit capsule Take  by mouth daily. Take half tab daily   Yes Provider, Historical   loratadine (CLARITIN) 10 mg tablet Take 10 mg by mouth daily. Yes Provider, Historical   multivitamin (ONE A DAY) tablet Take 1 Tab by mouth daily. Yes Provider, Historical   acetaminophen (TYLENOL) 325 mg tablet Take  by mouth every four (4) hours as needed. Yes Provider, Historical                     Allergies   Allergen Reactions    Azulfidine [Sulfasalazine] Rash    Arava [Leflunomide] Other (comments)     Elevated liver enzymes    Iodinated Contrast Media Other (comments)     Severe overall skin reaction    Prilosec [Omeprazole Magnesium] Nausea Only       Patient Active Problem List    Diagnosis Date Noted    Overweight (BMI 25.0-29.9) 04/18/2018    Gastroesophageal reflux disease 10/14/2017    Advance directive discussed with patient 04/20/2016    Hypothyroidism due to acquired atrophy of thyroid 04/22/2015    Osteopenia 04/22/2015    Family history of osteoporosis 09/17/2014    Colon polyp 03/12/2014    Inflammatory osteoarthritis 09/25/2013    Hyperlipidemia 03/27/2013    Hyperinsulinemia 03/27/2013    Vitamin D deficiency 03/27/2013       ROS as above in HPI, the rest are negative      Objective:   Vitals as above checked by patient  General: alert, cooperative, no distress   Mental  status: normal mood, behavior, speech, dress, motor activity, and thought processes, able to follow commands   HENT: NCAT   Neck: no visualized mass   Resp: no respiratory distress   Neuro: no gross deficits   Skin: no discoloration or lesions of concern on visible areas   Psychiatric: normal affect, consistent with stated mood     Additional exam findings:  none      We discussed the expected course, resolution and complications of the diagnosis(es) in detail. Medication risks, benefits, costs, interactions, and alternatives were discussed as indicated.   I advised her to contact the office if her condition worsens, changes or fails to improve as anticipated. She expressed understanding with the diagnosis(es) and plan. Annie Young is a 76 y.o. female who was evaluated by a video visit encounter for concerns as above. Patient identification was verified prior to start of the visit. A caregiver was present when appropriate. Due to this being a TeleHealth encounter (During [facility] emergency), evaluation of the following organ systems was limited: Vitals/Constitutional/EENT/Resp/CV/GI//MS/Neuro/Skin/Heme-Lymph-Imm. Pursuant to the emergency declaration under the [facility] waiver authority and the [employer] and [employer] Act, this Virtual  Visit was conducted, with patient's (and/or legal guardian's) consent, to reduce the patient's risk of exposure to COVID-19 and provide necessary medical care. Services were provided through a video synchronous discussion virtually to substitute for in-person clinic visit. Patient and provider were located at their individual homes.       Igor David MD

## 2020-04-29 NOTE — PATIENT INSTRUCTIONS
Gastroesophageal Reflux Disease (GERD): Care Instructions Your Care Instructions Gastroesophageal reflux disease (GERD) is the backward flow of stomach acid into the esophagus. The esophagus is the tube that leads from your throat to your stomach. A one-way valve prevents the stomach acid from moving up into this tube. When you have GERD, this valve does not close tightly enough. If you have mild GERD symptoms including heartburn, you may be able to control the problem with antacids or over-the-counter medicine. Changing your diet, losing weight, and making other lifestyle changes can also help reduce symptoms. Follow-up care is a key part of your treatment and safety. Be sure to make and go to all appointments, and call your doctor if you are having problems. It's also a good idea to know your test results and keep a list of the medicines you take. How can you care for yourself at home? · Take your medicines exactly as prescribed. Call your doctor if you think you are having a problem with your medicine. · Your doctor may recommend over-the-counter medicine. For mild or occasional indigestion, antacids, such as Tums, Gaviscon, Mylanta, or Maalox, may help. Your doctor also may recommend over-the-counter acid reducers, such as Pepcid AC, Tagamet HB, Zantac 75, or Prilosec. Read and follow all instructions on the label. If you use these medicines often, talk with your doctor. · Change your eating habits. ? It's best to eat several small meals instead of two or three large meals. ? After you eat, wait 2 to 3 hours before you lie down. ? Chocolate, mint, and alcohol can make GERD worse. ? Spicy foods, foods that have a lot of acid (like tomatoes and oranges), and coffee can make GERD symptoms worse in some people. If your symptoms are worse after you eat a certain food, you may want to stop eating that food to see if your symptoms get better. · Do not smoke or chew tobacco. Smoking can make GERD worse. If you need help quitting, talk to your doctor about stop-smoking programs and medicines. These can increase your chances of quitting for good. · If you have GERD symptoms at night, raise the head of your bed 6 to 8 inches by putting the frame on blocks or placing a foam wedge under the head of your mattress. (Adding extra pillows does not work.) · Do not wear tight clothing around your middle. · Lose weight if you need to. Losing just 5 to 10 pounds can help. When should you call for help? Call your doctor now or seek immediate medical care if: 
  · You have new or different belly pain.  
  · Your stools are black and tarlike or have streaks of blood.  
 Watch closely for changes in your health, and be sure to contact your doctor if: 
  · Your symptoms have not improved after 2 days.  
  · Food seems to catch in your throat or chest.  
Where can you learn more? Go to http://carina-alec.info/ Enter G849 in the search box to learn more about \"Gastroesophageal Reflux Disease (GERD): Care Instructions. \" Current as of: August 11, 2019Content Version: 12.4 © 0288-3754 Healthwise, Incorporated. Care instructions adapted under license by [employer] (which disclaims liability or warranty for this information). If you have questions about a medical condition or this instruction, always ask your healthcare professional. [place] [address] any warranty or liability for your use of this information.

## 2020-05-01 ENCOUNTER — HOSPITAL ENCOUNTER (OUTPATIENT)
Dept: LAB | Age: 65
Discharge: HOME | End: 2020-05-01
Payer: COMMERCIAL

## 2020-05-01 DIAGNOSIS — E78.5 HYPERLIPIDEMIA, UNSPECIFIED HYPERLIPIDEMIA TYPE: ICD-10-CM

## 2020-05-01 DIAGNOSIS — K21.9 GASTROESOPHAGEAL REFLUX DISEASE, ESOPHAGITIS PRESENCE NOT SPECIFIED: ICD-10-CM

## 2020-05-01 DIAGNOSIS — E03.4 HYPOTHYROIDISM DUE TO ACQUIRED ATROPHY OF THYROID: ICD-10-CM

## 2020-05-01 DIAGNOSIS — E55.9 VITAMIN D DEFICIENCY: ICD-10-CM

## 2020-05-01 DIAGNOSIS — R73.03 PREDIABETES: ICD-10-CM

## 2020-05-01 LAB
25(OH)D3 SERPL-MCNC: 30.3 NG/ML (ref 30–100)
ALBUMIN SERPL-MCNC: 4 G/DL (ref 3.4–5)
ALBUMIN/GLOB SERPL: 1.3 {RATIO} (ref 0.8–1.7)
ALP SERPL-CCNC: 56 U/L (ref 45–117)
ALT SERPL-CCNC: 42 U/L (ref 13–56)
ANION GAP SERPL CALC-SCNC: 5 MMOL/L (ref 3–18)
AST SERPL-CCNC: 37 U/L (ref 10–38)
BASOPHILS # BLD: 0 K/UL (ref 0–0.1)
BASOPHILS NFR BLD: 1 % (ref 0–2)
BILIRUB SERPL-MCNC: 0.4 MG/DL (ref 0.2–1)
BUN SERPL-MCNC: 17 MG/DL (ref 7–18)
BUN/CREAT SERPL: 19 (ref 12–20)
CALCIUM SERPL-MCNC: 8.9 MG/DL (ref 8.5–10.1)
CHLORIDE SERPL-SCNC: 109 MMOL/L (ref 100–111)
CHOLEST SERPL-MCNC: 226 MG/DL
CO2 SERPL-SCNC: 27 MMOL/L (ref 21–32)
CREAT SERPL-MCNC: 0.9 MG/DL (ref 0.6–1.3)
DIFFERENTIAL METHOD BLD: ABNORMAL
EOSINOPHIL # BLD: 0.3 K/UL (ref 0–0.4)
EOSINOPHIL NFR BLD: 4 % (ref 0–5)
ERYTHROCYTE [DISTWIDTH] IN BLOOD BY AUTOMATED COUNT: 14.9 % (ref 11.6–14.5)
GLOBULIN SER CALC-MCNC: 3.1 G/DL (ref 2–4)
GLUCOSE SERPL-MCNC: 90 MG/DL (ref 74–99)
HBA1C MFR BLD: 6 % (ref 4.2–5.6)
HCT VFR BLD AUTO: 40.7 % (ref 35–45)
HDLC SERPL-MCNC: 86 MG/DL (ref 40–60)
HDLC SERPL: 2.6 {RATIO} (ref 0–5)
HGB BLD-MCNC: 13.4 G/DL (ref 12–16)
LDLC SERPL CALC-MCNC: 110.4 MG/DL (ref 0–100)
LIPID PROFILE,FLP: ABNORMAL
LYMPHOCYTES # BLD: 2.7 K/UL (ref 0.9–3.6)
LYMPHOCYTES NFR BLD: 34 % (ref 21–52)
MCH RBC QN AUTO: 29.1 PG (ref 24–34)
MCHC RBC AUTO-ENTMCNC: 32.9 G/DL (ref 31–37)
MCV RBC AUTO: 88.5 FL (ref 74–97)
MONOCYTES # BLD: 0.9 K/UL (ref 0.05–1.2)
MONOCYTES NFR BLD: 11 % (ref 3–10)
NEUTS SEG # BLD: 4 K/UL (ref 1.8–8)
NEUTS SEG NFR BLD: 50 % (ref 40–73)
PLATELET # BLD AUTO: 224 K/UL (ref 135–420)
PMV BLD AUTO: 11.8 FL (ref 9.2–11.8)
POTASSIUM SERPL-SCNC: 4.2 MMOL/L (ref 3.5–5.5)
PROT SERPL-MCNC: 7.1 G/DL (ref 6.4–8.2)
RBC # BLD AUTO: 4.6 M/UL (ref 4.2–5.3)
SODIUM SERPL-SCNC: 141 MMOL/L (ref 136–145)
TRIGL SERPL-MCNC: 148 MG/DL (ref ?–150)
TSH SERPL DL<=0.05 MIU/L-ACNC: 3.47 UIU/ML (ref 0.36–3.74)
VLDLC SERPL CALC-MCNC: 29.6 MG/DL
WBC # BLD AUTO: 7.9 K/UL (ref 4.6–13.2)

## 2020-05-01 PROCEDURE — 83036 HEMOGLOBIN GLYCOSYLATED A1C: CPT

## 2020-05-01 PROCEDURE — 84443 ASSAY THYROID STIM HORMONE: CPT

## 2020-05-01 PROCEDURE — 82306 VITAMIN D 25 HYDROXY: CPT

## 2020-05-01 PROCEDURE — 80061 LIPID PANEL: CPT

## 2020-05-01 PROCEDURE — 80053 COMPREHEN METABOLIC PANEL: CPT

## 2020-05-01 PROCEDURE — 85025 COMPLETE CBC W/AUTO DIFF WBC: CPT

## 2020-09-23 DIAGNOSIS — N95.2 ATROPHIC VAGINITIS: ICD-10-CM

## 2020-09-23 RX ORDER — ESTRADIOL 10 UG/1
10 INSERT VAGINAL
Qty: 24 TAB | Refills: 3 | Status: SHIPPED | OUTPATIENT
Start: 2020-09-25 | End: 2021-09-01 | Stop reason: SDUPTHER

## 2020-10-28 ENCOUNTER — VIRTUAL VISIT (OUTPATIENT)
Dept: FAMILY MEDICINE CLINIC | Age: 65
End: 2020-10-28
Payer: COMMERCIAL

## 2020-10-28 DIAGNOSIS — E55.9 VITAMIN D DEFICIENCY: ICD-10-CM

## 2020-10-28 DIAGNOSIS — E03.4 HYPOTHYROIDISM DUE TO ACQUIRED ATROPHY OF THYROID: ICD-10-CM

## 2020-10-28 DIAGNOSIS — K21.9 GASTROESOPHAGEAL REFLUX DISEASE, UNSPECIFIED WHETHER ESOPHAGITIS PRESENT: ICD-10-CM

## 2020-10-28 DIAGNOSIS — R73.03 PREDIABETES: ICD-10-CM

## 2020-10-28 DIAGNOSIS — E78.5 HYPERLIPIDEMIA, UNSPECIFIED HYPERLIPIDEMIA TYPE: ICD-10-CM

## 2020-10-28 DIAGNOSIS — M85.80 OSTEOPENIA, UNSPECIFIED LOCATION: Primary | ICD-10-CM

## 2020-10-28 DIAGNOSIS — M19.90 INFLAMMATORY OSTEOARTHRITIS: ICD-10-CM

## 2020-10-28 PROBLEM — N95.2 ATROPHIC VAGINITIS: Status: ACTIVE | Noted: 2020-10-28

## 2020-10-28 PROCEDURE — 3017F COLORECTAL CA SCREEN DOC REV: CPT | Performed by: INTERNAL MEDICINE

## 2020-10-28 PROCEDURE — 1101F PT FALLS ASSESS-DOCD LE1/YR: CPT | Performed by: INTERNAL MEDICINE

## 2020-10-28 PROCEDURE — G8510 SCR DEP NEG, NO PLAN REQD: HCPCS | Performed by: INTERNAL MEDICINE

## 2020-10-28 PROCEDURE — 1090F PRES/ABSN URINE INCON ASSESS: CPT | Performed by: INTERNAL MEDICINE

## 2020-10-28 PROCEDURE — G8399 PT W/DXA RESULTS DOCUMENT: HCPCS | Performed by: INTERNAL MEDICINE

## 2020-10-28 PROCEDURE — G9899 SCRN MAM PERF RSLTS DOC: HCPCS | Performed by: INTERNAL MEDICINE

## 2020-10-28 PROCEDURE — G8427 DOCREV CUR MEDS BY ELIG CLIN: HCPCS | Performed by: INTERNAL MEDICINE

## 2020-10-28 PROCEDURE — 99214 OFFICE O/P EST MOD 30 MIN: CPT | Performed by: INTERNAL MEDICINE

## 2020-10-28 PROCEDURE — G0463 HOSPITAL OUTPT CLINIC VISIT: HCPCS | Performed by: INTERNAL MEDICINE

## 2020-10-28 NOTE — PATIENT INSTRUCTIONS
Gastroesophageal Reflux Disease (GERD): Care Instructions Overview Gastroesophageal reflux disease (GERD) is the backward flow of stomach acid into the esophagus. The esophagus is the tube that leads from your throat to your stomach. A one-way valve prevents the stomach acid from backing up into this tube. But when you have GERD, this valve does not close tightly enough. This can also cause pain and swelling in your esophagus. (This is called esophagitis.) If you have mild GERD symptoms including heartburn, you may be able to control the problem with antacids or over-the-counter medicine. You can also make lifestyle changes to help reduce your symptoms. These include changing your diet and eating habits, such as not eating late at night and losing weight. Follow-up care is a key part of your treatment and safety. Be sure to make and go to all appointments, and call your doctor if you are having problems. It's also a good idea to know your test results and keep a list of the medicines you take. How can you care for yourself at home? · Take your medicines exactly as prescribed. Call your doctor if you think you are having a problem with your medicine. · Your doctor may recommend over-the-counter medicine. For mild or occasional indigestion, antacids, such as Tums, Gaviscon, Mylanta, or Maalox, may help. Your doctor also may recommend over-the-counter acid reducers, such as famotidine (Pepcid AC), cimetidine (Tagamet HB), or omeprazole (Prilosec). Read and follow all instructions on the label. If you use these medicines often, talk with your doctor. · Change your eating habits. ? It's best to eat several small meals instead of two or three large meals. ? After you eat, wait 2 to 3 hours before you lie down. ? Chocolate, mint, and alcohol can make GERD worse. ?  Spicy foods, foods that have a lot of acid (like tomatoes and oranges), and coffee can make GERD symptoms worse in some people. If your symptoms are worse after you eat a certain food, you may want to stop eating that food to see if your symptoms get better. · Do not smoke or chew tobacco. Smoking can make GERD worse. If you need help quitting, talk to your doctor about stop-smoking programs and medicines. These can increase your chances of quitting for good. · If you have GERD symptoms at night, raise the head of your bed 6 to 8 inches by putting the frame on blocks or placing a foam wedge under the head of your mattress. (Adding extra pillows does not work.) · Do not wear tight clothing around your middle. · Lose weight if you need to. Losing just 5 to 10 pounds can help. When should you call for help? Call your doctor now or seek immediate medical care if: 
  · You have new or different belly pain.  
  · Your stools are black and tarlike or have streaks of blood. Watch closely for changes in your health, and be sure to contact your doctor if: 
  · Your symptoms have not improved after 2 days.  
  · Food seems to catch in your throat or chest.  
Where can you learn more? Go to http://www.gray.com/ Enter G246 in the search box to learn more about \"Gastroesophageal Reflux Disease (GERD): Care Instructions. \" Current as of: April 15, 2020               Content Version: 12.6 © 7880-7444 [employer]. Care instructions adapted under license by [employer] (which disclaims liability or warranty for this information). If you have questions about a medical condition or this instruction, always ask your healthcare professional. [place] [address] any warranty or liability for your use of this information.

## 2020-10-28 NOTE — PROGRESS NOTES
Chief Complaint   Patient presents with    Follow Up Chronic Condition     6 month; GERD, Cholesterol     1. Have you been to the ER, urgent care clinic since your last visit? Hospitalized since your last visit? No    2. Have you seen or consulted any other health care providers outside of the [address] [place] [address] since your last visit? Include any pap smears or colon screening.  Yes Where: Dr. Lois Martin Maintenance Due   Topic Date Due    Medicare Yearly Exam  05/01/2020    GLAUCOMA SCREENING Q2Y  08/16/2020

## 2020-10-28 NOTE — PROGRESS NOTES
Crystal Glez is a 76 y.o. female who was seen by synchronous (real-time) audio-video technology on 10/28/2020 for Follow Up Chronic Condition (6 month; GERD, Cholesterol)        Assessment & Plan:   Diagnoses and all orders for this visit:    1. Osteopenia, unspecified location-Has been on Fosamax for 2 years (high FRAX score/? Fragility fracture/family hx of ostoporosis), due for follow up Dexa; continue with daily Ca+d, regular weight bearing exercise  -     DEXA BONE DENSITY STUDY AXIAL; Future    2. Hyperlipidemia, unspecified hyperlipidemia type-on Crestor (elev HDL as well so no need to adjust dose)  -     METABOLIC PANEL, COMPREHENSIVE; Future  -     LIPID PANEL; Future    3. Hypothyroidism due to acquired atrophy of thyroid-continue with periodic monitoring of TSH as she has no sxs of hypothyroidism; anti TPO was normal  -     TSH 3RD GENERATION; Future    4. Prediabetes-continue with watching diet and regular exercise  -     METABOLIC PANEL, COMPREHENSIVE; Future  -     HEMOGLOBIN A1C WITH EAG; Future    5. Vitamin D deficiency-on OTC Ca+D  -     VITAMIN D, 25 HYDROXY; Future    6. Gastroesophageal reflux disease, unspecified whether esophagitis present-on Protonix, follow up with GI at some point for repeat EGD  -     CBC WITH AUTOMATED DIFF; Future    7. Inflammatory osteoarthritis-on the hands; Rheum following  Currently working with Hand surgery for recent finger fracture and trigger finger as well        Follow-up and Dispositions    · Return in about 6 months (around 4/28/2021) for follow up.          712  Subjective:     Health Maintenance Due   Topic Date Due    Medicare Yearly Exam -does not have Medicare B yet 05/01/2020    GLAUCOMA SCREENING Q2Y -wears eyeglasses/eye exam is up to date 08/16/2020     Broke a finger recently and had surgery on the right ring finger  Dr Frankie Huggins did the surgery    Off meds for inflammatory arthritis on her hands-had side effects on previous med started by Rheum    Protonix not working well-previous EGD was several years; has to take Tums or Pepcid at times  Prev treated for H pylori    On Fosamax for osteopenia but with FRAX score high-denies any inc in GERD from this med/  Started Fosamax 2 yrs ago  Due for follow up DExa    Lab Results   Component Value Date/Time    TSH 3.47 05/01/2020 08:20 AM    TSH 3.57 04/22/2015 09:00 AM   not on med  Neg anti TPO    Lab Results   Component Value Date/Time    Hemoglobin A1c 6.0 (H) 05/01/2020 08:20 AM       Lab Results   Component Value Date/Time    Vitamin D 25-Hydroxy 30.3 05/01/2020 08:20 AM     On OTC Vit D      Lab Results   Component Value Date/Time    Cholesterol, total 226 (H) 05/01/2020 08:20 AM    HDL Cholesterol 86 (H) 05/01/2020 08:20 AM    LDL-CHOLESTEROL 96 10/17/2018 09:40 AM    LDL, calculated 110.4 (H) 05/01/2020 08:20 AM    VLDL, calculated 29.6 05/01/2020 08:20 AM    Triglyceride 148 05/01/2020 08:20 AM    CHOL/HDL Ratio 2.6 05/01/2020 08:20 AM    Cholesterol/HDL ratio 2.6 10/17/2018 09:40 AM   on Crestor      Prior to Admission medications    Medication Sig Start Date End Date Taking? Authorizing Provider   estradioL (Vagifem) 10 mcg tab vaginal tablet Insert 1 Tab into vagina every Tuesday and Friday. 9/25/20  Yes Edith Sprague MD   pantoprazole (PROTONIX) 40 mg tablet Take 1 Tab by mouth daily. 4/29/20  Yes Edith Sprague MD   alendronate (FOSAMAX) 70 mg tablet Take 1 Tab by mouth every seven (7) days. 1/24/20  Yes Edith Sprague MD   rosuvastatin (CRESTOR) 20 mg tablet Take 1 Tab by mouth nightly. 1/24/20  Yes Edith Sprague MD   cholecalciferol, vitamin D3, (VITAMIN D3) 2,000 unit Tab Take  by mouth. Yes Provider, Historical   vitamin E (AQUA GEMS) 400 unit capsule Take  by mouth daily. Take half tab daily   Yes Provider, Historical   loratadine (CLARITIN) 10 mg tablet Take 10 mg by mouth daily.    Yes Provider, Historical   multivitamin (ONE A DAY) tablet Take 1 Tab by mouth daily. Yes Provider, Historical   acetaminophen (TYLENOL) 325 mg tablet Take  by mouth every four (4) hours as needed. Yes Provider, Historical     Patient Active Problem List    Diagnosis Date Noted    Atrophic vaginitis 10/28/2020    Overweight (BMI 25.0-29.9) 04/18/2018    Gastroesophageal reflux disease 10/14/2017    Advance directive discussed with patient 04/20/2016    Hypothyroidism due to acquired atrophy of thyroid 04/22/2015    Osteopenia 04/22/2015    Family history of osteoporosis 09/17/2014    Colon polyp 03/12/2014    Inflammatory osteoarthritis 09/25/2013    Hyperlipidemia 03/27/2013    Hyperinsulinemia 03/27/2013    Vitamin D deficiency 03/27/2013       ROS  Pt denies: Wt loss, Fever/Chills, HA, Visual changes, Fatigue, Chest pain, SOB, ROBERTS, Abd pain, N/V/D/C, Blood in stool or urine, Edema. Pertinent positive as above in HPI. All others were negative    Objective:     Patient-Reported Vitals 10/28/2020   Patient-Reported Pulse 72   Patient-Reported Temperature 97.8   Patient-Reported SpO2 95   Patient-Reported Systolic  106   Patient-Reported Diastolic 80      General: alert, cooperative, no distress   Mental  status: normal mood, behavior, speech, dress, motor activity, and thought processes, able to follow commands   HENT: NCAT   Neck: no visualized mass   Resp: no respiratory distress   Neuro: no gross deficits   Skin: no discoloration or lesions of concern on visible areas   Psychiatric: normal affect, consistent with stated mood, no evidence of hallucinations     Additional exam findings: none      We discussed the expected course, resolution and complications of the diagnosis(es) in detail. Medication risks, benefits, costs, interactions, and alternatives were discussed as indicated. I advised her to contact the office if her condition worsens, changes or fails to improve as anticipated. She expressed understanding with the diagnosis(es) and plan.        [facility] S[facility], who was evaluated through a patient-initiated, synchronous (real-time) audio-video encounter, and/or her healthcare decision maker, is aware that it is a billable service, with coverage as determined by her insurance carrier. She provided verbal consent to proceed: Yes, and patient identification was verified. It was conducted pursuant to the emergency declaration under the [address] [place] [address] and the [employer] General Act. A caregiver was present when appropriate. Ability to conduct physical exam was limited. I was at home. The patient was at home.       Sada Puga MD

## 2020-10-30 LAB
25(OH)D3+25(OH)D2 SERPL-MCNC: 56 NG/ML (ref 30–100)
ALBUMIN SERPL-MCNC: 4.7 G/DL (ref 3.8–4.8)
ALBUMIN/GLOB SERPL: 2.1 {RATIO} (ref 1.2–2.2)
ALP SERPL-CCNC: 65 IU/L (ref 39–117)
ALT SERPL-CCNC: 32 IU/L (ref 0–32)
AST SERPL-CCNC: 36 IU/L (ref 0–40)
BASOPHILS # BLD AUTO: 0.1 X10E3/UL (ref 0–0.2)
BASOPHILS NFR BLD AUTO: 1 %
BILIRUB SERPL-MCNC: 0.3 MG/DL (ref 0–1.2)
BUN SERPL-MCNC: 12 MG/DL (ref 8–27)
BUN/CREAT SERPL: 16 (ref 12–28)
CALCIUM SERPL-MCNC: 9.8 MG/DL (ref 8.7–10.3)
CHLORIDE SERPL-SCNC: 105 MMOL/L (ref 96–106)
CHOLEST SERPL-MCNC: 171 MG/DL (ref 100–199)
CO2 SERPL-SCNC: 23 MMOL/L (ref 20–29)
CREAT SERPL-MCNC: 0.76 MG/DL (ref 0.57–1)
EOSINOPHIL # BLD AUTO: 0.2 X10E3/UL (ref 0–0.4)
EOSINOPHIL NFR BLD AUTO: 2 %
ERYTHROCYTE [DISTWIDTH] IN BLOOD BY AUTOMATED COUNT: 14.1 % (ref 11.7–15.4)
EST. AVERAGE GLUCOSE BLD GHB EST-MCNC: 126 MG/DL
GLOBULIN SER CALC-MCNC: 2.2 G/DL (ref 1.5–4.5)
GLUCOSE SERPL-MCNC: 78 MG/DL (ref 65–99)
HBA1C MFR BLD: 6 % (ref 4.8–5.6)
HCT VFR BLD AUTO: 41.6 % (ref 34–46.6)
HDLC SERPL-MCNC: 82 MG/DL
HGB BLD-MCNC: 13.7 G/DL (ref 11.1–15.9)
IMM GRANULOCYTES # BLD AUTO: 0 X10E3/UL (ref 0–0.1)
IMM GRANULOCYTES NFR BLD AUTO: 0 %
INTERPRETATION, 910389: NORMAL
LDLC SERPL CALC-MCNC: 74 MG/DL (ref 0–99)
LYMPHOCYTES # BLD AUTO: 2.7 X10E3/UL (ref 0.7–3.1)
LYMPHOCYTES NFR BLD AUTO: 30 %
MCH RBC QN AUTO: 29.1 PG (ref 26.6–33)
MCHC RBC AUTO-ENTMCNC: 32.9 G/DL (ref 31.5–35.7)
MCV RBC AUTO: 89 FL (ref 79–97)
MONOCYTES # BLD AUTO: 0.8 X10E3/UL (ref 0.1–0.9)
MONOCYTES NFR BLD AUTO: 9 %
NEUTROPHILS # BLD AUTO: 5.1 X10E3/UL (ref 1.4–7)
NEUTROPHILS NFR BLD AUTO: 58 %
PLATELET # BLD AUTO: 213 X10E3/UL (ref 150–450)
POTASSIUM SERPL-SCNC: 4.3 MMOL/L (ref 3.5–5.2)
PROT SERPL-MCNC: 6.9 G/DL (ref 6–8.5)
RBC # BLD AUTO: 4.7 X10E6/UL (ref 3.77–5.28)
SODIUM SERPL-SCNC: 144 MMOL/L (ref 134–144)
TRIGL SERPL-MCNC: 83 MG/DL (ref 0–149)
TSH SERPL DL<=0.005 MIU/L-ACNC: 4.94 UIU/ML (ref 0.45–4.5)
VLDLC SERPL CALC-MCNC: 15 MG/DL (ref 5–40)
WBC # BLD AUTO: 8.8 X10E3/UL (ref 3.4–10.8)

## 2020-11-25 ENCOUNTER — HOSPITAL ENCOUNTER (OUTPATIENT)
Dept: GENERAL RADIOLOGY | Age: 65
Discharge: HOME | End: 2020-11-25
Attending: INTERNAL MEDICINE
Payer: COMMERCIAL

## 2020-11-25 DIAGNOSIS — Z13.820 ENCOUNTER FOR SCREENING FOR OSTEOPOROSIS: ICD-10-CM

## 2020-11-25 DIAGNOSIS — M85.80 OSTEOPENIA DETERMINED BY X-RAY: ICD-10-CM

## 2020-11-25 DIAGNOSIS — Z78.0 ASYMPTOMATIC MENOPAUSAL STATE: ICD-10-CM

## 2020-11-25 PROCEDURE — 77080 DXA BONE DENSITY AXIAL: CPT

## 2020-12-28 DIAGNOSIS — E78.5 HYPERLIPIDEMIA, UNSPECIFIED HYPERLIPIDEMIA TYPE: ICD-10-CM

## 2020-12-28 DIAGNOSIS — M85.80 OSTEOPENIA, UNSPECIFIED LOCATION: ICD-10-CM

## 2020-12-31 RX ORDER — ROSUVASTATIN CALCIUM 20 MG/1
20 TABLET, COATED ORAL
Qty: 90 TAB | Refills: 3 | Status: SHIPPED | OUTPATIENT
Start: 2020-12-31 | End: 2022-01-25

## 2020-12-31 RX ORDER — ALENDRONATE SODIUM 70 MG/1
70 TABLET ORAL
Qty: 12 TAB | Refills: 3 | Status: SHIPPED | OUTPATIENT
Start: 2020-12-31 | End: 2021-11-21 | Stop reason: SDUPTHER

## 2021-04-28 ENCOUNTER — OFFICE VISIT (OUTPATIENT)
Dept: FAMILY MEDICINE CLINIC | Age: 66
End: 2021-04-28
Payer: COMMERCIAL

## 2021-04-28 VITALS
WEIGHT: 170 LBS | OXYGEN SATURATION: 97 % | HEIGHT: 64 IN | DIASTOLIC BLOOD PRESSURE: 74 MMHG | BODY MASS INDEX: 29.02 KG/M2 | SYSTOLIC BLOOD PRESSURE: 113 MMHG | HEART RATE: 87 BPM | TEMPERATURE: 98.2 F | RESPIRATION RATE: 16 BRPM

## 2021-04-28 DIAGNOSIS — R73.03 PREDIABETES: ICD-10-CM

## 2021-04-28 DIAGNOSIS — E03.4 HYPOTHYROIDISM DUE TO ACQUIRED ATROPHY OF THYROID: ICD-10-CM

## 2021-04-28 DIAGNOSIS — E78.5 HYPERLIPIDEMIA, UNSPECIFIED HYPERLIPIDEMIA TYPE: Primary | ICD-10-CM

## 2021-04-28 DIAGNOSIS — K21.9 GASTROESOPHAGEAL REFLUX DISEASE, UNSPECIFIED WHETHER ESOPHAGITIS PRESENT: ICD-10-CM

## 2021-04-28 DIAGNOSIS — M85.80 OSTEOPENIA, UNSPECIFIED LOCATION: ICD-10-CM

## 2021-04-28 DIAGNOSIS — E55.9 VITAMIN D DEFICIENCY: ICD-10-CM

## 2021-04-28 DIAGNOSIS — M19.90 INFLAMMATORY OSTEOARTHRITIS: ICD-10-CM

## 2021-04-28 PROCEDURE — 99214 OFFICE O/P EST MOD 30 MIN: CPT | Performed by: INTERNAL MEDICINE

## 2021-04-28 RX ORDER — HYDROXYCHLOROQUINE SULFATE 200 MG/1
400 TABLET, FILM COATED ORAL DAILY
COMMUNITY

## 2021-04-28 NOTE — PATIENT INSTRUCTIONS
Learning About Low Bone Density  What is low bone density? Low bone density (sometimes called osteopenia) is a decrease in thickness, or density, in bones. That means the bones become thinner and weaker. It is much more common in women than in men. It's important to know that low bone density is not a disease. It can happen normally with aging. Having low bone density means that there is a greater risk that you may get osteoporosis. It also means that you are more likely to break a bone than someone who does not have low bone density. But not everyone with low bone density gets osteoporosis or breaks a bone. Low bone density doesn't cause any symptoms. It's usually found with a type of X-ray called a bone density test. Low bone density means that your bone density result (T-score) is between 1.0 and 2.5. What increases your risk for low bone density? Things that increase your risk include:  · Getting older. · Having a family history of osteoporosis. · Being thin. · Being white or . · Getting too little physical activity. · Smoking. · Drinking too much alcohol often. · Using certain medicines such as steroids. How can you prevent osteoporosis? There are things you can do to help prevent osteoporosis. Certain lifestyle changes will help slow the loss of bone density. · Eat food that has plenty of calcium and vitamin D. Yogurt, cheese, milk, and dark green vegetables are high in calcium. Eggs, fatty fish, cereal, and fortified milk are high in vitamin D.  · Ask your doctor if you need to take a calcium plus vitamin D supplement. You may be able to get enough calcium and vitamin D through your diet. · Get regular exercise. ? Do 30 minutes of weight-bearing exercise on most days of the week. Walking, jogging, stair climbing, and dancing are good choices. ? Do resistance exercises with weights or elastic bands 2 or 3 days a week.   · Limit alcohol to 2 drinks a day for men and 1 drink a day for women. Too much alcohol can cause health problems. · Do not smoke. Smoking can make bones thin faster. If you need help quitting, talk to your doctor about stop-smoking programs and medicines. These can increase your chances of quitting for good. Prescription medicines are available for treating low bone density. But these are more often used to treat osteoporosis. Follow-up care is a key part of your treatment and safety. Be sure to make and go to all appointments, and call your doctor if you are having problems. It's also a good idea to know your test results and keep a list of the medicines you take. Where can you learn more? Go to http://www.gray.com/  Enter M898 in the search box to learn more about \"Learning About Low Bone Density. \"  Current as of: December 7, 2020               Content Version: 12.8  © 0337-3017 Healthwise, Incorporated. Care instructions adapted under license by [employer] (which disclaims liability or warranty for this information). If you have questions about a medical condition or this instruction, always ask your healthcare professional. [address] any warranty or liability for your use of this information.

## 2021-04-28 NOTE — PROGRESS NOTES
Chief Complaint   Patient presents with    Follow Up Chronic Condition       Pt is a 78y.o. 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 Humira-local inj reaction-huge welts  June appt with Dr Marilynn Yo was 2020    On daily PPI    Lab Results   Component Value Date/Time    Hemoglobin A1c 6.0 (H) 04/28/2021 12:00 AM     ROS:    Pt denies: Wt loss, Fever/Chills, HA, Visual changes, Fatigue, Chest pain, SOB, ROBERTS, Abd pain, N/V/D/C, Blood in stool or urine, Edema. Pertinent positive as above in HPI. All others were negative    Patient Active Problem List   Diagnosis Code    Hyperlipidemia E78.5    Vitamin D deficiency E55.9    Inflammatory osteoarthritis M19.90    Colon polyp K63.5    Family history of osteoporosis Z82.62    Hypothyroidism due to acquired atrophy of thyroid E03.4    Osteopenia M85.80    Advance directive discussed with patient Z70.80    Gastroesophageal reflux disease K21.9    Overweight (BMI 25.0-29. 9) E66.3    Atrophic vaginitis N95.2       Past Medical History:   Diagnosis Date    Anemia NEC     as a young child    Arthritis     GERD (gastroesophageal reflux disease)     Hypercholesterolemia     Hypothyroidism due to acquired atrophy of thyroid 4/22/2015       Current Outpatient Medications   Medication Sig Dispense Refill    hydrOXYchloroQUINE (Plaquenil) 200 mg tablet Take 400 mg by mouth daily.  alendronate (FOSAMAX) 70 mg tablet Take 1 Tab by mouth every seven (7) days. 12 Tab 3    rosuvastatin (Crestor) 20 mg tablet Take 1 Tab by mouth nightly. 90 Tab 3    pantoprazole (PROTONIX) 40 mg tablet Take 1 Tab by mouth daily. 90 Tab 2    estradioL (Vagifem) 10 mcg tab vaginal tablet Insert 1 Tab into vagina every Tuesday and Friday. 24 Tab 3    cholecalciferol, vitamin D3, (VITAMIN D3) 2,000 unit Tab Take 5,000 Units by mouth.  vitamin E (AQUA GEMS) 400 unit capsule Take  by mouth daily. Take half tab daily      loratadine (CLARITIN) 10 mg tablet Take 10 mg by mouth daily.  multivitamin (ONE A DAY) tablet Take 1 Tab by mouth daily.  acetaminophen (TYLENOL) 325 mg tablet Take  by mouth every four (4) hours as needed. Social History     Tobacco Use   Smoking Status Never Smoker   Smokeless Tobacco Never Used       Allergies   Allergen Reactions    Azulfidine [Sulfasalazine] Rash    Arava [Leflunomide] Other (comments)     Elevated liver enzymes    Iodinated Contrast Media Other (comments)     Severe overall skin reaction    Prilosec [Omeprazole Magnesium] Nausea Only       Patient Labs were reviewed: yes      Patient Past Records were reviewed:  yes        Objective:     Vitals:    04/28/21 0828   BP: 113/74   Pulse: 87   Resp: 16   Temp: 98.2 °F (36.8 °C)   TempSrc: Temporal   SpO2: 97%   Weight: 170 lb (77.1 kg)   Height: 5' 4\" (1.626 m)     Body mass index is 29.18 kg/m². Exam:   Appearance: alert, well appearing,  oriented to person, place, and time, acyanotic, in no respiratory distress and well hydrated.   HEENT:  NC/AT, pink conj, anicteric sclerae  Neck:  No cervical lymphadenopathy, no JVD, no thyromegaly, no carotid bruit  Heart:  RRR without M/R/G  Lungs:  CTAB, no rhonchi, rales, or wheezes with good air exchange   Abdomen:  Non-tender, pos bowel sounds, no hepatosplenomegaly  Ext:  No C/C/E , multiple joint swelling on fingers of both hands; right ring finger which showed a surgical scar is slightly flexed and she cannot straighten it   Skin: no rash  Neuro: no lateralizing signs, CNs II-XII intact      Assessment/ Plan:   Diagnoses and all orders for this visit:    1. Hyperlipidemia, unspecified hyperlipidemia type-continue with Crestor with goal LDL of less than 100  -     LIPID PANEL; Future    2. Inflammatory osteoarthritis of the hands-Rheum following  -     CBC WITH AUTOMATED DIFF; Future  -     METABOLIC PANEL, COMPREHENSIVE; Future    3. Hypothyroidism due to acquired atrophy of thyroid-neg anti TPO so will monitor TSH and if it goes above 10 or she starts to have sxs of hypothyroidism, will start med  -     TSH 3RD GENERATION; Future    4. Vitamin D deficiency-on OTC Ca+d daily  -     VITAMIN D, 25 HYDROXY; Future    5. Osteopenia, unspecified location-Frax score high and ?fragility fracture- continue with Fosamax    6. Prediabetes-continue to watch diet and we will monitor A1c  -     HEMOGLOBIN A1C WITH EAG; Future    7. Gastroesophageal reflux disease, unspecified whether esophagitis present-on daily PPI        Follow-up and Dispositions    · Return in about 6 months (around 10/28/2021) for follow up. Routing History              I have discussed the diagnosis with the patient and the intended plan as seen in the above orders. The patient has received an After-Visit Summary and questions were answered concerning future plans. Medication Side Effects and Warnings were discussed with patient: yes    Patient verbalized understanding of above instructions.     Tasha Poole MD  Internal Medicine  [facility]

## 2021-04-28 NOTE — PROGRESS NOTES
1. Have you been to the ER, urgent care clinic since your last visit? Hospitalized since your last visit? No    2. Have you seen or consulted any other health care providers outside of the [address] [place] [address] since your last visit? Include any pap smears or colon screening.  Yes Dr. Gladis West follow up and Dr. Kwasi Duncan Maintenance Due   Topic Date Due    Medicare Yearly Exam  Never done

## 2021-04-29 LAB
25(OH)D3+25(OH)D2 SERPL-MCNC: 50.1 NG/ML (ref 30–100)
ALBUMIN SERPL-MCNC: 4.3 G/DL (ref 3.8–4.8)
ALBUMIN/GLOB SERPL: 1.9 {RATIO} (ref 1.2–2.2)
ALP SERPL-CCNC: 66 IU/L (ref 39–117)
ALT SERPL-CCNC: 25 IU/L (ref 0–32)
AST SERPL-CCNC: 30 IU/L (ref 0–40)
BASOPHILS # BLD AUTO: 0.1 X10E3/UL (ref 0–0.2)
BASOPHILS NFR BLD AUTO: 1 %
BILIRUB SERPL-MCNC: 0.3 MG/DL (ref 0–1.2)
BUN SERPL-MCNC: 17 MG/DL (ref 8–27)
BUN/CREAT SERPL: 20 (ref 12–28)
CALCIUM SERPL-MCNC: 9.6 MG/DL (ref 8.7–10.3)
CHLORIDE SERPL-SCNC: 103 MMOL/L (ref 96–106)
CHOLEST SERPL-MCNC: 174 MG/DL (ref 100–199)
CO2 SERPL-SCNC: 23 MMOL/L (ref 20–29)
CREAT SERPL-MCNC: 0.86 MG/DL (ref 0.57–1)
EOSINOPHIL # BLD AUTO: 0.3 X10E3/UL (ref 0–0.4)
EOSINOPHIL NFR BLD AUTO: 4 %
ERYTHROCYTE [DISTWIDTH] IN BLOOD BY AUTOMATED COUNT: 13.8 % (ref 11.7–15.4)
EST. AVERAGE GLUCOSE BLD GHB EST-MCNC: 126 MG/DL
GLOBULIN SER CALC-MCNC: 2.3 G/DL (ref 1.5–4.5)
GLUCOSE SERPL-MCNC: 97 MG/DL (ref 65–99)
HBA1C MFR BLD: 6 % (ref 4.8–5.6)
HCT VFR BLD AUTO: 40.8 % (ref 34–46.6)
HDLC SERPL-MCNC: 77 MG/DL
HGB BLD-MCNC: 13.6 G/DL (ref 11.1–15.9)
IMM GRANULOCYTES # BLD AUTO: 0 X10E3/UL (ref 0–0.1)
IMM GRANULOCYTES NFR BLD AUTO: 0 %
IMP & REVIEW OF LAB RESULTS: NORMAL
LDLC SERPL CALC-MCNC: 81 MG/DL (ref 0–99)
LYMPHOCYTES # BLD AUTO: 1.9 X10E3/UL (ref 0.7–3.1)
LYMPHOCYTES NFR BLD AUTO: 28 %
MCH RBC QN AUTO: 29.1 PG (ref 26.6–33)
MCHC RBC AUTO-ENTMCNC: 33.3 G/DL (ref 31.5–35.7)
MCV RBC AUTO: 87 FL (ref 79–97)
MONOCYTES # BLD AUTO: 0.7 X10E3/UL (ref 0.1–0.9)
MONOCYTES NFR BLD AUTO: 10 %
NEUTROPHILS # BLD AUTO: 3.9 X10E3/UL (ref 1.4–7)
NEUTROPHILS NFR BLD AUTO: 57 %
PLATELET # BLD AUTO: 200 X10E3/UL (ref 150–450)
POTASSIUM SERPL-SCNC: 4.4 MMOL/L (ref 3.5–5.2)
PROT SERPL-MCNC: 6.6 G/DL (ref 6–8.5)
RBC # BLD AUTO: 4.68 X10E6/UL (ref 3.77–5.28)
SODIUM SERPL-SCNC: 141 MMOL/L (ref 134–144)
TRIGL SERPL-MCNC: 91 MG/DL (ref 0–149)
TSH SERPL DL<=0.005 MIU/L-ACNC: 4.33 UIU/ML (ref 0.45–4.5)
VLDLC SERPL CALC-MCNC: 16 MG/DL (ref 5–40)
WBC # BLD AUTO: 6.8 X10E3/UL (ref 3.4–10.8)

## 2021-06-25 ENCOUNTER — DOCUMENTATION ONLY (OUTPATIENT)
Dept: INTERNAL MEDICINE CLINIC | Age: 66
End: 2021-06-25

## 2021-06-28 NOTE — PROGRESS NOTES
Coworker, [employer] employee with [employer] [address] [employer] PCP- her PCP is unavailable Friday's - complains of right foot pain with weightbearing - ordered xray of right foot.

## 2021-08-11 DIAGNOSIS — K21.9 GASTROESOPHAGEAL REFLUX DISEASE, UNSPECIFIED WHETHER ESOPHAGITIS PRESENT: ICD-10-CM

## 2021-08-12 RX ORDER — PANTOPRAZOLE SODIUM 40 MG/1
TABLET, DELAYED RELEASE ORAL
Qty: 90 TABLET | Refills: 2 | Status: SHIPPED | OUTPATIENT
Start: 2021-08-12 | End: 2022-05-03

## 2021-09-01 DIAGNOSIS — N95.2 ATROPHIC VAGINITIS: ICD-10-CM

## 2021-09-01 RX ORDER — ESTRADIOL 10 UG/1
INSERT VAGINAL
Qty: 24 TABLET | Refills: 3 | Status: SHIPPED | OUTPATIENT
Start: 2021-09-01 | End: 2022-08-22

## 2021-10-27 ENCOUNTER — OFFICE VISIT (OUTPATIENT)
Dept: FAMILY MEDICINE CLINIC | Age: 66
End: 2021-10-27
Payer: COMMERCIAL

## 2021-10-27 VITALS
WEIGHT: 172.5 LBS | SYSTOLIC BLOOD PRESSURE: 124 MMHG | RESPIRATION RATE: 16 BRPM | HEIGHT: 64 IN | OXYGEN SATURATION: 100 % | BODY MASS INDEX: 29.45 KG/M2 | DIASTOLIC BLOOD PRESSURE: 72 MMHG | HEART RATE: 73 BPM | TEMPERATURE: 98.3 F

## 2021-10-27 DIAGNOSIS — E03.4 HYPOTHYROIDISM DUE TO ACQUIRED ATROPHY OF THYROID: ICD-10-CM

## 2021-10-27 DIAGNOSIS — M85.852 OSTEOPENIA OF NECKS OF BOTH FEMURS: ICD-10-CM

## 2021-10-27 DIAGNOSIS — R73.03 PREDIABETES: ICD-10-CM

## 2021-10-27 DIAGNOSIS — M19.90 INFLAMMATORY OSTEOARTHRITIS: ICD-10-CM

## 2021-10-27 DIAGNOSIS — M85.851 OSTEOPENIA OF NECKS OF BOTH FEMURS: ICD-10-CM

## 2021-10-27 DIAGNOSIS — E78.5 HYPERLIPIDEMIA, UNSPECIFIED HYPERLIPIDEMIA TYPE: ICD-10-CM

## 2021-10-27 DIAGNOSIS — Z00.00 WELL WOMAN EXAM (NO GYNECOLOGICAL EXAM): Primary | ICD-10-CM

## 2021-10-27 DIAGNOSIS — E55.9 VITAMIN D DEFICIENCY: ICD-10-CM

## 2021-10-27 PROCEDURE — 99397 PER PM REEVAL EST PAT 65+ YR: CPT | Performed by: INTERNAL MEDICINE

## 2021-10-27 NOTE — PATIENT INSTRUCTIONS

## 2021-10-27 NOTE — PROGRESS NOTES
Assessment/ Plan:   Diagnoses and all orders for this visit:    1. Well woman exam (no gynecological exam)-Advised re: monthly self breast exam, dental prophylaxis Q 6 months, regular exercise, yearly eye exam, daily intake of Ca+D    2. Hyperlipidemia, unspecified hyperlipidemia type-goal LDL of less than 100 on Crestor  -     METABOLIC PANEL, COMPREHENSIVE; Future  -     LIPID PANEL; Future    3. Hypothyroidism due to acquired atrophy of thyroid-neg anti TPO; continue to monitor TSH  -     TSH 3RD GENERATION; Future    4. Prediabetes-continue with watching diet and regular exercise  -     HEMOGLOBIN A1C WITH EAG; Future    5. Vitamin D deficiency-on OTC vit D  -     VITAMIN D, 25 HYDROXY; Future    6. Osteopenia of necks of both femurs-currently on Fosamax bec of elev FRAX score and possible fragility fracture     7. Inflammatory osteoarthritis-managed by Rheum  -     CBC WITH AUTOMATED DIFF; Future    Also seeing hand surgeon for persistent problems with the broken finger      Follow-up and Dispositions    · Return in about 6 months (around 4/27/2022) for follow up.    Routing History                  Chief Complaint   Patient presents with    Follow Up Chronic Condition    Annual Wellness Visit       Pt is a 78y.o. year old female who presents for follow up of her chronic medical problems/annual wellness visit    Health Maintenance Due   Topic Date Due    Medicare Yearly Exam -does not have Medicare Never done    COVID-19 Vaccine (3 - Velazquez Peter booster)-done/documented 07/28/2021     Wt Readings from Last 3 Encounters:   10/27/21 172 lb 8 oz (78.2 kg)   04/28/21 170 lb (77.1 kg)   10/09/19 166 lb 3.2 oz (75.4 kg)   Covid weight-sweet tooth/stress eating    Hand arthritis-more surgery on the right ring finger-needs rebroken and reset; hardware broken  Right handed    Oral surgeon about to do dental implant in November  On Fosamax-osteopenia with high FRAX score  Goes to a dentist regularly      Lab Results Component Value Date/Time    Vitamin D 25-Hydroxy 30.3 05/01/2020 08:20 AM    VITAMIN D, 25-HYDROXY 46.2 10/27/2021 12:00 AM     On OTC 2000 units    Lab Results   Component Value Date/Time    TSH 3.380 10/27/2021 12:00 AM    TSH 3.57 04/22/2015 09:00 AM      Family hx of DM so worried about her A1C as well    Compliant with Crestor    No new sxs    ROS:    Pt denies: Wt loss, Fever/Chills, HA, Visual changes, Fatigue, Chest pain, SOB, ROBERTS, Abd pain, N/V/D/C, Blood in stool or urine, Edema. Pertinent positive as above in HPI. All others were negative    Patient Active Problem List   Diagnosis Code    Hyperlipidemia E78.5    Vitamin D deficiency E55.9    Inflammatory osteoarthritis M19.90    Colon polyp K63.5    Family history of osteoporosis Z82.62    Hypothyroidism due to acquired atrophy of thyroid E03.4    Osteopenia M85.80    Advance directive discussed with patient Z70.80    Gastroesophageal reflux disease K21.9    Overweight (BMI 25.0-29. 9) E66.3    Atrophic vaginitis N95.2       Past Medical History:   Diagnosis Date    Anemia NEC     as a young child    Arthritis     GERD (gastroesophageal reflux disease)     Hypercholesterolemia     Hypothyroidism due to acquired atrophy of thyroid 4/22/2015       Current Outpatient Medications   Medication Sig Dispense Refill    estradioL (VAGIFEM) 10 mcg tab vaginal tablet INSERT 1 TABLET INTO VAGINA EVERY TUESDAY AND FRIDAY. DISCARD PREVIOUS ORDER 24 Tablet 3    pantoprazole (PROTONIX) 40 mg tablet TAKE 1 TABLET BY MOUTH ONE TIME A DAY 90 Tablet 2    hydrOXYchloroQUINE (Plaquenil) 200 mg tablet Take 400 mg by mouth daily.  alendronate (FOSAMAX) 70 mg tablet Take 1 Tab by mouth every seven (7) days. 12 Tab 3    rosuvastatin (Crestor) 20 mg tablet Take 1 Tab by mouth nightly. 90 Tab 3    cholecalciferol, vitamin D3, (VITAMIN D3) 2,000 unit Tab Take 5,000 Units by mouth.  vitamin E (AQUA GEMS) 400 unit capsule Take  by mouth daily.  Take half tab daily      loratadine (CLARITIN) 10 mg tablet Take 10 mg by mouth daily.  multivitamin (ONE A DAY) tablet Take 1 Tab by mouth daily.  acetaminophen (TYLENOL) 325 mg tablet Take  by mouth every four (4) hours as needed. Social History     Tobacco Use   Smoking Status Never Smoker   Smokeless Tobacco Never Used       Allergies   Allergen Reactions    Azulfidine [Sulfasalazine] Rash    Arava [Leflunomide] Other (comments)     Elevated liver enzymes    Iodinated Contrast Media Other (comments)     Severe overall skin reaction    Prilosec [Omeprazole Magnesium] Nausea Only       Patient Labs were reviewed: yes    Patient Past Records were reviewed: yes      Objective:     Vitals:    10/27/21 0830   BP: 124/72   Pulse: 73   Resp: 16   Temp: 98.3 °F (36.8 °C)   TempSrc: Temporal   SpO2: 100%   Weight: 172 lb 8 oz (78.2 kg)   Height: 5' 4\" (1.626 m)     Body mass index is 29.61 kg/m². Exam:   Appearance: alert, well appearing,  oriented to person, place, and time, acyanotic, in no respiratory distress and well hydrated. HEENT:  NC/AT, pink conj, anicteric sclerae  Neck:  No cervical lymphadenopathy, no JVD, no thyromegaly, no carotid bruit  Heart:  RRR without M/R/G  Lungs:  CTAB, no rhonchi, rales, or wheezes with good air exchange   Abdomen:  Non-tender, pos bowel sounds, no hepatosplenomegaly  Ext:  No C/C/E    Skin: no rash  Neuro: no lateralizing signs, CNs II-XII intact            I have discussed the diagnosis with the patient and the intended plan as seen in the above orders. The patient has received an After-Visit Summary and questions were answered concerning future plans. Medication Side Effects and Warnings were discussed with patient: yes    Patient verbalized understanding of above instructions.     Dia Brooke MD  Internal Medicine  [facility]

## 2021-10-27 NOTE — PROGRESS NOTES
Patient seen for routine follow up without concerns    1. Have you been to the ER, urgent care clinic since your last visit? Hospitalized since your last visit? No    2. Have you seen or consulted any other health care providers outside of the [address] [place] [address] since your last visit? Include any pap smears or colon screening. Yes Orthopedic and Rhuematology for routine visit. Health Maintenance Due   Topic Date Due    Medicare Yearly Exam  Never done    COVID-19 Vaccine (3 - Velazquez Peter booster) 07/28/2021       Patient declined medicare wellness d/t insurance.

## 2021-10-28 LAB
25(OH)D3+25(OH)D2 SERPL-MCNC: 46.2 NG/ML (ref 30–100)
ALBUMIN SERPL-MCNC: 4.1 G/DL (ref 3.8–4.8)
ALBUMIN/GLOB SERPL: 2 {RATIO} (ref 1.2–2.2)
ALP SERPL-CCNC: 64 IU/L (ref 44–121)
ALT SERPL-CCNC: 24 IU/L (ref 0–32)
AST SERPL-CCNC: 32 IU/L (ref 0–40)
BASOPHILS # BLD AUTO: 0.1 X10E3/UL (ref 0–0.2)
BASOPHILS NFR BLD AUTO: 1 %
BILIRUB SERPL-MCNC: <0.2 MG/DL (ref 0–1.2)
BUN SERPL-MCNC: 13 MG/DL (ref 8–27)
BUN/CREAT SERPL: 15 (ref 12–28)
CALCIUM SERPL-MCNC: 9.1 MG/DL (ref 8.7–10.3)
CHLORIDE SERPL-SCNC: 107 MMOL/L (ref 96–106)
CHOLEST SERPL-MCNC: 180 MG/DL (ref 100–199)
CO2 SERPL-SCNC: 23 MMOL/L (ref 20–29)
CREAT SERPL-MCNC: 0.86 MG/DL (ref 0.57–1)
EOSINOPHIL # BLD AUTO: 0.2 X10E3/UL (ref 0–0.4)
EOSINOPHIL NFR BLD AUTO: 3 %
ERYTHROCYTE [DISTWIDTH] IN BLOOD BY AUTOMATED COUNT: 12.9 % (ref 11.7–15.4)
EST. AVERAGE GLUCOSE BLD GHB EST-MCNC: 123 MG/DL
GLOBULIN SER CALC-MCNC: 2.1 G/DL (ref 1.5–4.5)
GLUCOSE SERPL-MCNC: 77 MG/DL (ref 65–99)
HBA1C MFR BLD: 5.9 % (ref 4.8–5.6)
HCT VFR BLD AUTO: 35.8 % (ref 34–46.6)
HDLC SERPL-MCNC: 65 MG/DL
HGB BLD-MCNC: 11.7 G/DL (ref 11.1–15.9)
IMM GRANULOCYTES # BLD AUTO: 0 X10E3/UL (ref 0–0.1)
IMM GRANULOCYTES NFR BLD AUTO: 0 %
IMP & REVIEW OF LAB RESULTS: NORMAL
LDLC SERPL CALC-MCNC: 93 MG/DL (ref 0–99)
LYMPHOCYTES # BLD AUTO: 2.1 X10E3/UL (ref 0.7–3.1)
LYMPHOCYTES NFR BLD AUTO: 33 %
MCH RBC QN AUTO: 28.7 PG (ref 26.6–33)
MCHC RBC AUTO-ENTMCNC: 32.7 G/DL (ref 31.5–35.7)
MCV RBC AUTO: 88 FL (ref 79–97)
MONOCYTES # BLD AUTO: 0.5 X10E3/UL (ref 0.1–0.9)
MONOCYTES NFR BLD AUTO: 8 %
NEUTROPHILS # BLD AUTO: 3.5 X10E3/UL (ref 1.4–7)
NEUTROPHILS NFR BLD AUTO: 55 %
PLATELET # BLD AUTO: 193 X10E3/UL (ref 150–450)
POTASSIUM SERPL-SCNC: 4.5 MMOL/L (ref 3.5–5.2)
PROT SERPL-MCNC: 6.2 G/DL (ref 6–8.5)
RBC # BLD AUTO: 4.07 X10E6/UL (ref 3.77–5.28)
SODIUM SERPL-SCNC: 143 MMOL/L (ref 134–144)
TRIGL SERPL-MCNC: 124 MG/DL (ref 0–149)
TSH SERPL DL<=0.005 MIU/L-ACNC: 3.38 UIU/ML (ref 0.45–4.5)
VLDLC SERPL CALC-MCNC: 22 MG/DL (ref 5–40)
WBC # BLD AUTO: 6.4 X10E3/UL (ref 3.4–10.8)

## 2021-11-21 DIAGNOSIS — M85.80 OSTEOPENIA, UNSPECIFIED LOCATION: ICD-10-CM

## 2021-11-22 RX ORDER — ALENDRONATE SODIUM 70 MG/1
70 TABLET ORAL
Qty: 12 TABLET | Refills: 3 | Status: SHIPPED | OUTPATIENT
Start: 2021-11-22

## 2022-01-12 ENCOUNTER — OFFICE VISIT (OUTPATIENT)
Dept: ORTHOPEDIC SURGERY | Age: 67
End: 2022-01-12
Payer: COMMERCIAL

## 2022-01-12 VITALS — OXYGEN SATURATION: 99 % | TEMPERATURE: 97.1 F | HEART RATE: 95 BPM | WEIGHT: 176.4 LBS | BODY MASS INDEX: 30.28 KG/M2

## 2022-01-12 DIAGNOSIS — M19.071 PRIMARY OSTEOARTHRITIS OF RIGHT FOOT: ICD-10-CM

## 2022-01-12 DIAGNOSIS — R52 PAIN: Primary | ICD-10-CM

## 2022-01-12 DIAGNOSIS — M21.619 BUNION OF GREAT TOE: ICD-10-CM

## 2022-01-12 PROCEDURE — 73630 X-RAY EXAM OF FOOT: CPT | Performed by: ORTHOPAEDIC SURGERY

## 2022-01-12 PROCEDURE — 99203 OFFICE O/P NEW LOW 30 MIN: CPT | Performed by: ORTHOPAEDIC SURGERY

## 2022-01-12 RX ORDER — ESTRADIOL 0.1 MG/G
CREAM VAGINAL
COMMUNITY
End: 2022-01-12

## 2022-01-12 RX ORDER — VITAMIN E 268 MG
400 CAPSULE ORAL DAILY
COMMUNITY

## 2022-01-12 RX ORDER — UBIDECARENONE 30 MG
200 CAPSULE ORAL
COMMUNITY

## 2022-01-12 NOTE — PROGRESS NOTES
AMBULATORY PROGRESS NOTE      Patient: Ramon Landry             MRN: [id number]     SSN: [id number] Body mass index is 30.28 kg/m². YOB: 1955     AGE: 75 y.o. EX: female    PCP: Madhav He MD       IMPRESSION //  DIAGNOSIS AND TREATMENT PLAN        Ramon Landry has a diagnosis of: Ramon Landry has exquisite tenderness of second metatarsal distal one third I am concerned about impending stress fracture, that in this region, she clearly my opinion needs to be protected. .  Recommendation, for hard sole shoe      DIAGNOSES    1. Pain    2. Primary osteoarthritis of right foot    3. Bunion of great toe        Orders Placed This Encounter    [82854] Foot Min 3V     Order Specific Question:   Weight bearing? Answer:   No    DISCONTD: estradioL (ESTRACE) 0.01 % (0.1 mg/gram) vaginal cream     Sig: Insert  into vagina.  therapeutic multivitamin-minerals (THERAGRAN-M) tablet     Sig: Take 1 Tablet by mouth daily.  coenzyme Q-10 (CO Q-10) 30 mg capsule     Sig: Take 200 mg by mouth.  vitamin E (AQUA GEMS) 400 unit capsule     Sig: Take 400 Units by mouth daily. PLAN:    1. Obtain 3-View XR of right foot  2. DME: Hard sole shoe will be given and placed on patient    RTO : 3 weeks    There are no Patient Instructions on file for this visit. Please follow up with your PCP for any health maintenance as recommended         Ramon Landry  expresses understanding of the diagnosis, treatment plan, and all of their proposed questions were answered to their satisfaction. Patient education has been provided re the diagnoses.          [facility] //   Binta Montero IS A 75 y.o. female who is a/an  new patient, presenting to my outpatient office for evaluation of  the following chief complaint(s):     Chief Complaint   Patient presents with    Foot Pain     right       Reta Maxim Suresh presents today w/ atraumatic right foot pain. She recalls having right foot pain about 3 miles into a walk on ~12/18/2021. She mentions now she has difficulty using steps and doing daily activities. H/O Vitamin D , two 5th metatarsal fractures of the right foot, borderline osteopenic. She is on Fosamax. Visit Vitals  Pulse 95   Temp 97.1 °F (36.2 °C) (Temporal)   Wt 176 lb 6.4 oz (80 kg)   SpO2 99%   BMI 30.28 kg/m²       Appearance: Alert, well appearing and pleasant patient who is in no distress, oriented to person, place/time, and who follows commands. Normal dress/motor activity/thought processes/memory. This patient is accompanied in the examination room by her  self. Patient arrives to office via: without assistive device:     Psychiatric:  Normal Affect/mood. Judgement, behavior, and conduct are appropriate. Speech normal in context and clarity, memory intact grossly, no involuntary movements - tremors. H EENT (2): Head normocephalic & atraumatic. Eye: pupils are round// EOM are intact // Neck: ROM WNL  // Hearings Intact   Respiratory: Breathing non labored     ANKLE/FOOT right    Gait: normal  Tenderness: mild over  2nd metatarsal distal 1/3 and 3rd metatarsal ( not as severe)  Cutaneous: mild swelling 1/3 forefoot  Joint Motion: Ankle hindfoot joints have supple range of motion: WNL   Joint / Tendon Stability:  No Ankle or Subtalar instability or joint laxity. No peroneal sublux ability or dislocation  Alignment: neutral Hindfoot,    Neuro Motor/Sensory: NL/NL  Vascular: NL foot/ankle pulses,   Lymphatics: No extremity lymphedema, No calf swelling, no tenderness to calf muscles. CHART REVIEW     [address] [place] [address] has been experiencing pain and discomfort confirmed as outlined in the pain assessment outlined below.  was reviewed by Rabia Buckley MD on 1/12/2022.      Pain Assessment  1/12/2022   Location of Pain Foot   Location Modifiers Right   Severity of Pain 4   Quality of Pain Aching   Duration of Pain Persistent   Frequency of Pain Intermittent   Aggravating Factors Walking;Stairs;Standing;Squatting   Limiting Behavior Yes   Relieving Factors Rest   Result of Injury No        Chrisrena Tacho Lopez  has a past medical history of Anemia NEC, Arthritis, GERD (gastroesophageal reflux disease), Hypercholesterolemia, and Hypothyroidism due to acquired atrophy of thyroid (4/22/2015). She has no past medical history of Arrhythmia, Asthma, Autoimmune disease ([address] CAD (coronary artery disease), Calculus of kidney, Cancer ([address] Chronic kidney disease, Chronic pain, Congestive heart failure, unspecified, Contact dermatitis and other eczema, due to unspecified cause, COPD, Depression, Diabetes ([address] Headache(784.0), Hypertension, Liver disease, PUD (peptic ulcer disease), Seizures ([address] Stroke ([address] or Thromboembolus ([address] Patients is employed at:         Past Medical History:   Diagnosis Date    Anemia NEC     as a young child    Arthritis     GERD (gastroesophageal reflux disease)     Hypercholesterolemia     Hypothyroidism due to acquired atrophy of thyroid 4/22/2015     Past Surgical History:   Procedure Laterality Date    HAND/FINGER SURGERY UNLISTED  2017    HAND/FINGER SURGERY UNLISTED      hardware removal     HAND/FINGER SURGERY UNLISTED      osteotomy    HAND/FINGER SURGERY UNLISTED      orif    HAND/FINGER SURGERY UNLISTED      trigger release     HX GYN      D&C    HX TONSILLECTOMY      HX WRIST FRACTURE TX  09/01/2017     Current Outpatient Medications   Medication Sig    therapeutic multivitamin-minerals (THERAGRAN-M) tablet Take 1 Tablet by mouth daily.  coenzyme Q-10 (CO Q-10) 30 mg capsule Take 200 mg by mouth.  vitamin E (AQUA GEMS) 400 unit capsule Take 400 Units by mouth daily.  alendronate (FOSAMAX) 70 mg tablet Take 1 Tablet by mouth every seven (7) days.     estradioL (VAGIFEM) 10 mcg tab vaginal tablet INSERT 1 TABLET INTO VAGINA EVERY TUESDAY AND FRIDAY. DISCARD PREVIOUS ORDER    pantoprazole (PROTONIX) 40 mg tablet TAKE 1 TABLET BY MOUTH ONE TIME A DAY    hydrOXYchloroQUINE (Plaquenil) 200 mg tablet Take 400 mg by mouth daily.  rosuvastatin (Crestor) 20 mg tablet Take 1 Tab by mouth nightly.  cholecalciferol, vitamin D3, (VITAMIN D3) 2,000 unit Tab Take 5,000 Units by mouth.  loratadine (CLARITIN) 10 mg tablet Take 10 mg by mouth daily.  acetaminophen (TYLENOL) 325 mg tablet Take  by mouth every four (4) hours as needed. No current facility-administered medications for this visit. Allergies   Allergen Reactions    Azulfidine [Sulfasalazine] Rash    Arava [Leflunomide] Other (comments)     Elevated liver enzymes    Iodinated Contrast Media Other (comments)     Severe overall skin reaction    Prilosec [Omeprazole Magnesium] Nausea Only     Social History     Occupational History    Not on file   Tobacco Use    Smoking status: Never Smoker    Smokeless tobacco: Never Used   Vaping Use    Vaping Use: Never used   Substance and Sexual Activity    Alcohol use: No    Drug use: No    Sexual activity: Not on file     Family History   Problem Relation Age of Onset    Lung Disease Mother     Hypertension Mother     Diabetes Mother     Dementia Mother     Heart Disease Father         DIAGNOSTIC LAB DATA      Lab Results   Component Value Date/Time    Hemoglobin A1c 5.9 (H) 10/27/2021 12:00 AM    Hemoglobin A1c 6.0 (H) 04/28/2021 12:00 AM    Hemoglobin A1c 6.0 (H) 10/29/2020 09:28 AM    //   Lab Results   Component Value Date/Time    Glucose 77 10/27/2021 12:00 AM        No results found for: [id number], [id number]      Lab Results   Component Value Date/Time    Vitamin D 25-Hydroxy 30.3 05/01/2020 08:20 AM    VITAMIN D, 25-HYDROXY 46.2 10/27/2021 12:00 AM        Drug Screen Most Recent Result Date    No resulted procedures found.            REVIEW OF SYSTEMS : 1/12/2022  ALL BELOW ARE Negative except : SEE HPI     All other systems reviewed and are negative. 12 point review of systems otherwise negative unless noted in HPI. RADIOGRAPHS// IMAGING//DIAGNOSTIC DATA     Orders Placed This Encounter    [83991] Foot Min 3V    DISCONTD: estradioL (ESTRACE) 0.01 % (0.1 mg/gram) vaginal cream    therapeutic multivitamin-minerals (THERAGRAN-M) tablet    coenzyme Q-10 (CO Q-10) 30 mg capsule    vitamin E (AQUA GEMS) 400 unit capsule           X-rays, right foot, 3 views: Views, AP/LAT/OBL completed 1/12/2022 AT [facility]    X-rays reveal I see no acute fracture to the forefoot distal forefoot. At the metatarsals 2 through 4 or 5. She does have a healed, fracture, fracture, appears chronic, to the right fourth metatarsal base (patient admits to fracturing her right fifth metatarsal base in 2 regions in the past remote past). There are no dislocation or subluxation noted. Overall alignment is  acceptable. Soft tissue swelling is mild dorsal forefoot noted. No osteolytic or osteoblastic lesions noted. Mineralization suggests  osteopenia. Degenerative changes are great toe first MTP, which she has some OA changes across the MTP, and has a bunion alignment. Noted. Calcified vessels are not present. I have personally reviewed the images of the above study. The interpretation of this study is my professional opinion    XR Results (most recent):  Results from Hospital Encounter encounter on 06/25/21    XR FOOT RT MIN 3 V    Narrative  EXAM: Right Foot X-ray    INDICATION:  RT forefoot pain with weightbearing    TECHNIQUE: 3 views of the right foot obtained. COMPARISON: None. FINDINGS: The bone density is grossly unremarkable. No fracture or dislocation  identified. Mild hallux valgus is noted. The first MTP joint demonstrates  asymmetric joint space narrowing, subchondral sclerosis and osteophyte  formation. There is no evidence of erosions or periostitis. No lytic or blastic  focus appreciated. The soft tissues are unremarkable. Impression  1. Mild hallux valgus and moderate first MTP osteoarthritis. I have spent 20 minutes reviewing the previous notes, reviewing diagnostic studies [Advanced  Imaging, Diagnostic test results (x-rays)] and had a direct face to face with the patient discussing the diagnosis and importance of compliance with the treatment and plan. There is  discussion for the potential for surgery, answering all questions, as well as documenting patient care coordination for this individual on the day of the visit. Disclaimer:     Sections of this note are dictated using utilizing voice recognition software, which may have resulted in some phonetic based errors in grammar and contents. Even though attempts were made to correct all the mistakes, some may have been missed, and remained in the body of the document. If questions arise, please contact our department. An electronic signature was used to authenticate this note. [facility] may have a reminder for a \"due or due soon\" health maintenance. I have asked that she contact her primary care provider for follow-up on this health maintenance. There are no Patient Instructions on file for this visit. Please follow up with your PCP for any health maintenance as recommended.               Pau Goel, as dictated by Eula  1/12/2022  7:53 AM

## 2022-01-25 DIAGNOSIS — E78.5 HYPERLIPIDEMIA, UNSPECIFIED HYPERLIPIDEMIA TYPE: ICD-10-CM

## 2022-01-25 RX ORDER — ROSUVASTATIN CALCIUM 20 MG/1
TABLET, COATED ORAL
Qty: 90 TABLET | Refills: 3 | Status: SHIPPED | OUTPATIENT
Start: 2022-01-25

## 2022-02-02 ENCOUNTER — OFFICE VISIT (OUTPATIENT)
Dept: ORTHOPEDIC SURGERY | Age: 67
End: 2022-02-02
Payer: COMMERCIAL

## 2022-02-02 VITALS
WEIGHT: 172.8 LBS | TEMPERATURE: 96.9 F | HEART RATE: 98 BPM | HEIGHT: 64 IN | BODY MASS INDEX: 29.5 KG/M2 | OXYGEN SATURATION: 90 %

## 2022-02-02 DIAGNOSIS — M19.071 PRIMARY OSTEOARTHRITIS OF RIGHT FOOT: ICD-10-CM

## 2022-02-02 DIAGNOSIS — S92.324A CLOSED NONDISPLACED FRACTURE OF SECOND METATARSAL BONE OF RIGHT FOOT, INITIAL ENCOUNTER: Primary | ICD-10-CM

## 2022-02-02 PROCEDURE — 99213 OFFICE O/P EST LOW 20 MIN: CPT | Performed by: ORTHOPAEDIC SURGERY

## 2022-02-02 PROCEDURE — 28470 CLTX METATARSAL FX WO MNP EA: CPT | Performed by: ORTHOPAEDIC SURGERY

## 2022-02-02 PROCEDURE — 73630 X-RAY EXAM OF FOOT: CPT | Performed by: ORTHOPAEDIC SURGERY

## 2022-02-02 NOTE — PROGRESS NOTES
AMBULATORY PROGRESS NOTE      Patient: Fuentes Bernard             MRN: [id number]     SSN: [id number] Body mass index is 29.66 kg/m². YOB: 1955     AGE: 75 y.o. EX: female    PCP: Karen Perry MD       IMPRESSION //  DIAGNOSIS AND TREATMENT PLAN        Fuentes Bernard has a diagnosis of:      I discussed with her, she has a second tarsal base stress fracture. I recommend continued protection in her hard soled shoe that she does have. On a reassess her in about 5 to 6 weeks, review with repeat x-rays of her right foot nonweightbearing please. DIAGNOSES    1. Closed nondisplaced fracture of second metatarsal bone of right foot, initial encounter    2. Primary osteoarthritis of right foot        Orders Placed This Encounter    [75083] Foot Min 3V     Order Specific Question:   Weight bearing? Answer: Yes    [place] CLOSED [place] METATARSAL FX            PLAN:    1. I advise the patient to continue wearing her hard sole shoe as she as an impeding stress fracture. 2. Obtain 3-View XR of right foot   3. Obtain XRs of right foot at next OV    RTO: 5 weeks    There are no Patient Instructions on file for this visit. Please follow up with your PCP for any health maintenance as recommended         Fuentes Bernard  expresses understanding of the diagnosis, treatment plan, and all of their proposed questions were answered to their satisfaction. Patient education has been provided re the diagnoses. [facility] //  [address] IS A 75 y.o. female who is a/an  established patient, presenting to my outpatient office for evaluation of  the following chief complaint(s):     Chief Complaint   Patient presents with    Foot Pain     Right foot follow up        At [facility] presented w/ OA of left foot and bunion of Great toe. Obtain 3-View XR of right foot.  DME: Hard sole shoe will be given and placed on patient. Since [address] states she is doing better. She continues to endorse tenderness to the ball of her right foot. She reiterates that the pain started 6 weeks ago and initially saw improvement; however, the pain worsened after she went for a walk. Visit Vitals  Pulse 98   Temp 96.9 °F (36.1 °C) (Temporal)   Ht 5' 4\" (1.626 m)   Wt 172 lb 12.8 oz (78.4 kg)   SpO2 90%   BMI 29.66 kg/m²       Appearance: Alert, well appearing and pleasant patient who is in no distress, oriented to person, place/time, and who follows commands. Normal dress/motor activity/thought processes/memory. This patient is accompanied in the examination room by her  self. Patient arrives to office via: with assistive device: hard sole shoe    Psychiatric:  Normal Affect/mood. Judgement, behavior, and conduct are appropriate. Speech normal in context and clarity, memory intact grossly, no involuntary movements - tremors. H EENT (2): Head normocephalic & atraumatic. Eye: pupils are round// EOM are intact // Neck: ROM WNL  // Hearings Intact   Respiratory: Breathing non labored     ANKLE/FOOT right    Gait: uses assistive device hard sole shoe  Tenderness: mild over  middle 1/3 2nd metatarsal (dorsal side)  Cutaneous:  WNL. Joint Motion:  WNL. Joint / Tendon Stability:  No Ankle or Subtalar instability or joint laxity. No peroneal sublux ability or dislocation  Alignment: neutral Hindfoot,    Neuro Motor/Sensory: NL/NL  Vascular: NL foot/ankle pulses,   Lymphatics: No extremity lymphedema, No calf swelling, no tenderness to calf muscles. CHART REVIEW     [address] [place] [address] has been experiencing pain and discomfort confirmed as outlined in the pain assessment outlined below.  was reviewed by Shannan Gaona on 2/2/2022.      Pain Assessment  2/2/2022   Location of Pain Foot   Location Modifiers Right   Severity of Pain 1   Quality of Pain Aching   Duration of Pain A few minutes   Frequency of Pain Intermittent   Aggravating Factors Walking   Limiting Behavior -   Relieving Factors Rest;NSAID   Result of Injury No        Harleen Hearing Lorrie Zeng  has a past medical history of Anemia NEC, Arthritis, GERD (gastroesophageal reflux disease), Hypercholesterolemia, and Hypothyroidism due to acquired atrophy of thyroid (4/22/2015). She has no past medical history of Arrhythmia, Asthma, Autoimmune disease ([address] CAD (coronary artery disease), Calculus of kidney, Cancer ([address] Chronic kidney disease, Chronic pain, Congestive heart failure, unspecified, Contact dermatitis and other eczema, due to unspecified cause, COPD, Depression, Diabetes ([address] Headache(784.0), Hypertension, Liver disease, PUD (peptic ulcer disease), Seizures ([address] Stroke ([address] or Thromboembolus ([address] Patients is employed at:         Past Medical History:   Diagnosis Date    Anemia NEC     as a young child    Arthritis     GERD (gastroesophageal reflux disease)     Hypercholesterolemia     Hypothyroidism due to acquired atrophy of thyroid 4/22/2015     Past Surgical History:   Procedure Laterality Date    HAND/FINGER SURGERY UNLISTED  2017    HAND/FINGER SURGERY UNLISTED      hardware removal     HAND/FINGER SURGERY UNLISTED      osteotomy    HAND/FINGER SURGERY UNLISTED      orif    HAND/FINGER SURGERY UNLISTED      trigger release     HX GYN      D&C    HX TONSILLECTOMY      HX WRIST FRACTURE TX  09/01/2017     Current Outpatient Medications   Medication Sig    rosuvastatin (CRESTOR) 20 mg tablet TAKE ONE TABLET BY MOUTH NIGHTLY    therapeutic multivitamin-minerals (THERAGRAN-M) tablet Take 1 Tablet by mouth daily.  coenzyme Q-10 (CO Q-10) 30 mg capsule Take 200 mg by mouth.  vitamin E (AQUA GEMS) 400 unit capsule Take 400 Units by mouth daily.  alendronate (FOSAMAX) 70 mg tablet Take 1 Tablet by mouth every seven (7) days.     estradioL (VAGIFEM) 10 mcg tab vaginal tablet INSERT 1 TABLET INTO VAGINA EVERY TUESDAY AND FRIDAY. DISCARD PREVIOUS ORDER    pantoprazole (PROTONIX) 40 mg tablet TAKE 1 TABLET BY MOUTH ONE TIME A DAY    hydrOXYchloroQUINE (Plaquenil) 200 mg tablet Take 400 mg by mouth daily.  cholecalciferol, vitamin D3, (VITAMIN D3) 2,000 unit Tab Take 5,000 Units by mouth.  loratadine (CLARITIN) 10 mg tablet Take 10 mg by mouth daily.  acetaminophen (TYLENOL) 325 mg tablet Take  by mouth every four (4) hours as needed. No current facility-administered medications for this visit. Allergies   Allergen Reactions    Azulfidine [Sulfasalazine] Rash    Arava [Leflunomide] Other (comments)     Elevated liver enzymes    Iodinated Contrast Media Other (comments)     Severe overall skin reaction    Prilosec [Omeprazole Magnesium] Nausea Only     Social History     Occupational History    Not on file   Tobacco Use    Smoking status: Never Smoker    Smokeless tobacco: Never Used   Vaping Use    Vaping Use: Never used   Substance and Sexual Activity    Alcohol use: No    Drug use: No    Sexual activity: Not on file     Family History   Problem Relation Age of Onset    Lung Disease Mother     Hypertension Mother     Diabetes Mother     Dementia Mother     Heart Disease Father         DIAGNOSTIC LAB DATA      Lab Results   Component Value Date/Time    Hemoglobin A1c 5.9 (H) 10/27/2021 12:00 AM    Hemoglobin A1c 6.0 (H) 04/28/2021 12:00 AM    Hemoglobin A1c 6.0 (H) 10/29/2020 09:28 AM    //   Lab Results   Component Value Date/Time    Glucose 77 10/27/2021 12:00 AM        No results found for: [id number], [id number]      Lab Results   Component Value Date/Time    Vitamin D 25-Hydroxy 30.3 05/01/2020 08:20 AM    VITAMIN D, 25-HYDROXY 46.2 10/27/2021 12:00 AM        Drug Screen Most Recent Result Date    No resulted procedures found. REVIEW OF SYSTEMS : 2/2/2022  ALL BELOW ARE Negative except : SEE HPI     All other systems reviewed and are negative. 12 point review of systems otherwise negative unless noted in HPI. RADIOGRAPHS// IMAGING//DIAGNOSTIC DATA     Orders Placed This Encounter    [05469] Foot Min 3V    [place] CLOSED TX METATARSAL FX        Right foot x-rays, 3 views, AP/LAT/OBL completed 2/2/2022 [facility] [address] [place] [address]    X-rays reveal there is a fracture of the right second metatarsal base, with some early periosteal reaction, there are no, dislocation or subluxation noted. Overall alignment is   acceptable. Soft tissue swelling is not noted. No osteolytic or osteoblastic lesions noted. Mineralization suggests  osteopenia. Degenerative changes are midfoot TMT joints are present not noted. Calcified vessels are not present. I have personally reviewed the images of the above study. The interpretation of this study is my professional opinion            I have spent 25 minutes reviewing the previous notes, reviewing diagnostic studies [Advanced  Imaging, Diagnostic test results (x-rays)] and had a direct face to face with the patient discussing the diagnosis and importance of compliance with the treatment and plan. There is  discussion for the potential for surgery, answering all questions, as well as documenting patient care coordination for this individual on the day of the visit. Disclaimer:     Sections of this note are dictated using utilizing voice recognition software, which may have resulted in some phonetic based errors in grammar and contents. Even though attempts were made to correct all the mistakes, some may have been missed, and remained in the body of the document. If questions arise, please contact our department. An electronic signature was used to authenticate this note. [facility] may have a reminder for a \"due or due soon\" health maintenance. I have asked that she contact her primary care provider for follow-up on this health maintenance.     There are no Patient Instructions on file for this visit. Please follow up with your PCP for any health maintenance as recommended.                 Serena Griffin, as dictated by, Anand Davis  2/2/2022  7:36 AM

## 2022-03-16 ENCOUNTER — OFFICE VISIT (OUTPATIENT)
Dept: ORTHOPEDIC SURGERY | Age: 67
End: 2022-03-16
Payer: COMMERCIAL

## 2022-03-16 VITALS
TEMPERATURE: 97.1 F | HEIGHT: 63 IN | HEART RATE: 82 BPM | WEIGHT: 172.6 LBS | BODY MASS INDEX: 30.58 KG/M2 | OXYGEN SATURATION: 97 %

## 2022-03-16 DIAGNOSIS — M79.671 RIGHT FOOT PAIN: ICD-10-CM

## 2022-03-16 DIAGNOSIS — S92.354K CLOSED NONDISPLACED FRACTURE OF FIFTH METATARSAL BONE OF RIGHT FOOT WITH NONUNION, SUBSEQUENT ENCOUNTER: ICD-10-CM

## 2022-03-16 DIAGNOSIS — S92.324A CLOSED NONDISPLACED FRACTURE OF SECOND METATARSAL BONE OF RIGHT FOOT, INITIAL ENCOUNTER: Primary | ICD-10-CM

## 2022-03-16 DIAGNOSIS — M19.071 PRIMARY OSTEOARTHRITIS OF RIGHT FOOT: ICD-10-CM

## 2022-03-16 PROCEDURE — 73630 X-RAY EXAM OF FOOT: CPT | Performed by: ORTHOPAEDIC SURGERY

## 2022-03-16 PROCEDURE — 99024 POSTOP FOLLOW-UP VISIT: CPT | Performed by: ORTHOPAEDIC SURGERY

## 2022-03-19 PROBLEM — K21.9 GASTROESOPHAGEAL REFLUX DISEASE: Status: ACTIVE | Noted: 2017-10-14

## 2022-03-19 PROBLEM — E66.3 OVERWEIGHT (BMI 25.0-29.9): Status: ACTIVE | Noted: 2018-04-18

## 2022-03-19 PROBLEM — N95.2 ATROPHIC VAGINITIS: Status: ACTIVE | Noted: 2020-10-28

## 2022-05-02 DIAGNOSIS — K21.9 GASTROESOPHAGEAL REFLUX DISEASE, UNSPECIFIED WHETHER ESOPHAGITIS PRESENT: ICD-10-CM

## 2022-05-03 RX ORDER — PANTOPRAZOLE SODIUM 40 MG/1
TABLET, DELAYED RELEASE ORAL
Qty: 90 TABLET | Refills: 2 | Status: SHIPPED | OUTPATIENT
Start: 2022-05-03

## 2022-05-03 NOTE — PROGRESS NOTES
AMBULATORY PROGRESS NOTE      Patient: Boone Kunz             MRN: [id number]     SSN: [id number] Body mass index is 30.57 kg/m². YOB: 1955     AGE: 75 y.o. EX: female    PCP: Lexi Grubbs MD       IMPRESSION //  DIAGNOSIS AND TREATMENT PLAN        Boone Kunz has a diagnosis of:      I explained to her, and reiterated to her, that she does have a right second metatarsal base stress fracture. She informed her that it does not hurt over the metatarsal.  But when I palpate her she did has distinct tenderness still to the proximal one third of her right second metatarsal.  She also has some tenderness to the second metatarsal head plantar hernández. It is also to be noted, that she has a bunion deformity as well. X-rays, I showed her the x-rays. X-rays 3 views right foot: AP, lateral, oblique images, from [facility], dated 5/4/2022. These images, shows a healed right fourth metatarsal base fracture, continued healing of the right second metatarsal base fracture. The linear line, still present. There is associated generalized osteopenia throughout the metatarsals, 1, 2, 3, 4, and 5. On very close inspection, there is a slight lucent line seen to the right fifth metatarsal base, at the medial portion of the fifth metatarsal, (patient is non tender in this region). DIAGNOSES    1. 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 Metatarsalgia of right foot        Orders Placed This Encounter    Generic Supply Order     Custom Trilaminar Orthotic w/ met pad: goal to offload 2nd metatarsal  ( )    Idris Scott    AMB POC XRAY, FOOT; COMPLETE, 3+ VIEW     ASK ALL FEMALE PATIENTS IF PREGNANT     Order Specific Question:   Reason for Exam     Answer:   PAIN            PLAN:    1. Obtain 3-View XR of right foot  2.  DME: Custom Trilaminar Orthotic w/ met pad: goal to offload 2nd metatarsal  ( ) Nacho Benitez    RTO: June 1st , 2022    There are no Patient Instructions on file for this visit. Please follow up with your PCP for any health maintenance as recommended         [facility]  expresses understanding of the diagnosis, treatment plan, and all of their proposed questions were answered to their satisfaction. Patient education has been provided re the diagnoses. HPI //  [address] IS A 75 y.o. female who is a/an  established patient, presenting to my outpatient office for evaluation of  the following chief complaint(s):     Chief Complaint   Patient presents with    Foot Pain     RT F/U       At [address] presented w/ fracture of 2nd metatarsal of right foot. Obtain 3-View XR of right foot. DME: ultrasound base bone stimulator    Since LOV [facility] continues to endorse right foot pain. She states she still has the original pain at the ball of her foot. She notes an occasional sharp pain as she steps out of the shower or as she steps down. She brought a pair of [employer] shoes [email or web address]. Visit Vitals  Pulse 82   Temp (!) 95.9 °F (35.5 °C) (Temporal)   Ht 5' 3\" (1.6 m)   Wt 172 lb 9.6 oz (78.3 kg)   SpO2 98%   BMI 30.57 kg/m²       Appearance: Alert, well appearing and pleasant patient who is in no distress, oriented to person, place/time, and who follows commands. Normal dress/motor activity/thought processes/memory. This patient is accompanied in the examination room by her  self. Patient arrives to office via: with assistive device: orthotic    Psychiatric:  Normal Affect/mood. Judgement, behavior, and conduct are appropriate. Speech normal in context and clarity, memory intact grossly, no involuntary movements - tremors. H EENT (2): Head normocephalic & atraumatic.   Eye: pupils are round// EOM are intact // Neck: ROM WNL  // Hearings Intact   Respiratory: Breathing non labored     ANKLE/FOOT right    Gait: uses assistive device orthotic  Tenderness: mild over  proximal 1/3rd 3rd metatarsal and 2nd metatarsal head plantarward,  Cutaneous:  WNL. Joint Motion:  WNL   Joint / Tendon Stability:  No Ankle or Subtalar instability or joint laxity. No peroneal sublux ability or dislocation  Alignment: neutral Hindfoot,    Neuro Motor/Sensory: NL/NL  Vascular: NL foot/ankle pulses,   Lymphatics: No extremity lymphedema, No calf swelling, no tenderness to calf muscles. CHART REVIEW     [address] [place] [address] has been experiencing pain and discomfort confirmed as outlined in the pain assessment outlined below.  was reviewed by Juan Hooker MD on 5/4/2022. Pain Assessment  5/4/2022   Location of Pain Foot   Location Modifiers Right   Severity of Pain 2   Quality of Pain Sharp   Duration of Pain -   Frequency of Pain Intermittent   Aggravating Factors Walking; Other (Comment)   Aggravating Factors Comment stepping the wrong way   Limiting Behavior Yes   Relieving Factors Rest   Result of Injury No        Tonye Prier Brando Chacon  has a past medical history of Anemia NEC, Arthritis, GERD (gastroesophageal reflux disease), Hypercholesterolemia, and Hypothyroidism due to acquired atrophy of thyroid (4/22/2015). She has no past medical history of Arrhythmia, Asthma, Autoimmune disease ([address] CAD (coronary artery disease), Calculus of kidney, Cancer ([address] Chronic kidney disease, Chronic pain, Congestive heart failure, unspecified, Contact dermatitis and other eczema, due to unspecified cause, COPD, Depression, Diabetes ([address] Headache(784.0), Hypertension, Liver disease, PUD (peptic ulcer disease), Seizures ([address] Stroke ([address] or Thromboembolus ([address]      Patients is employed at:          has a past medical history of Anemia NEC, Arthritis, GERD (gastroesophageal reflux disease), Hypercholesterolemia, and Hypothyroidism due to acquired atrophy of thyroid (4/22/2015). She has no past medical history of Arrhythmia, Asthma, Autoimmune disease ([facility] CAD (coronary artery disease), Calculus of kidney, Cancer ([facility] Chronic kidney disease, Chronic pain, Congestive heart failure, unspecified, Contact dermatitis and other eczema, due to unspecified cause, COPD, Depression, Diabetes ([address] Headache(784.0), Hypertension, Liver disease, PUD (peptic ulcer disease), Seizures ([address] Stroke ([facility] [address] or Thromboembolus ([facility] [address] [facility] has a past surgical history that includes hx tonsillectomy; hx gyn; hx wrist fracture tx (09/01/2017); hand/finger surgery unlisted (2017); hand/finger surgery unlisted; hand/finger surgery unlisted; hand/finger surgery unlisted; and hand/finger surgery unlisted. family history includes Dementia in her mother; Diabetes in her mother; Heart Disease in her father; Hypertension in her mother; Lung Disease in her mother. Current Outpatient Medications   Medication Sig    pantoprazole (PROTONIX) 40 mg tablet TAKE 1 TABLET BY MOUTH ONE TIME A DAY    rosuvastatin (CRESTOR) 20 mg tablet TAKE ONE TABLET BY MOUTH NIGHTLY    therapeutic multivitamin-minerals (THERAGRAN-M) tablet Take 1 Tablet by mouth daily.  coenzyme Q-10 (CO Q-10) 30 mg capsule Take 200 mg by mouth.  vitamin E (AQUA GEMS) 400 unit capsule Take 400 Units by mouth daily.  alendronate (FOSAMAX) 70 mg tablet Take 1 Tablet by mouth every seven (7) days.  estradioL (VAGIFEM) 10 mcg tab vaginal tablet INSERT 1 TABLET INTO VAGINA EVERY TUESDAY AND FRIDAY. DISCARD PREVIOUS ORDER    hydrOXYchloroQUINE (Plaquenil) 200 mg tablet Take 400 mg by mouth daily.  cholecalciferol, vitamin D3, (VITAMIN D3) 2,000 unit Tab Take 5,000 Units by mouth.  loratadine (CLARITIN) 10 mg tablet Take 10 mg by mouth daily.  acetaminophen (TYLENOL) 325 mg tablet Take  by mouth every four (4) hours as needed.      No current facility-administered medications for this visit. Allergies   Allergen Reactions    Azulfidine [Sulfasalazine] Rash    Arava [Leflunomide] Other (comments)     Elevated liver enzymes    Iodinated Contrast Media Other (comments)     Severe overall skin reaction    Prilosec [Omeprazole Magnesium] Nausea Only     Social History     Occupational History    Not on file   Tobacco Use    Smoking status: Never Smoker    Smokeless tobacco: Never Used   Vaping Use    Vaping Use: Never used   Substance and Sexual Activity    Alcohol use: No    Drug use: No    Sexual activity: Not on file       reports that she has never smoked. She has never used smokeless tobacco. She reports that she does not drink alcohol and does not use drugs. DIAGNOSTIC LAB DATA      Lab Results   Component Value Date/Time    Hemoglobin A1c 5.9 (H) 10/27/2021 12:00 AM    Hemoglobin A1c 6.0 (H) 04/28/2021 12:00 AM    Hemoglobin A1c 6.0 (H) 10/29/2020 09:28 AM    //   Lab Results   Component Value Date/Time    Glucose 77 10/27/2021 12:00 AM        No results found for: [id number], [id number]      Lab Results   Component Value Date/Time    Vitamin D 25-Hydroxy 30.3 05/01/2020 08:20 AM    VITAMIN D, 25-HYDROXY 46.2 10/27/2021 12:00 AM        Drug Screen Most Recent Result Date    No resulted procedures found. REVIEW OF SYSTEMS : 5/4/2022  ALL BELOW ARE Negative except : SEE HPI     All other systems reviewed and are negative. 12 point review of systems otherwise negative unless noted in HPI. RADIOGRAPHS// IMAGING//DIAGNOSTIC DATA     Orders Placed This Encounter    Generic Supply Order    AMB POC XRAY, FOOT; COMPLETE, 3+ VIEW        No notes on file     X-rays, I showed her the x-rays. X-rays 3 views right foot: AP, lateral, oblique images, from [facility], dated 5/4/2022. These images, shows a healed right fourth metatarsal base fracture, continued healing of the right second metatarsal base fracture. The linear line, still present.   There is associated generalized osteopenia throughout the metatarsals, 1, 2, 3, 4, and 5. On very close inspection, there is a slight lucent line seen to the right fifth metatarsal base, at the medial portion of the fifth metatarsal, (patient is non tender in this region). I have personally reviewed the images of the above study. The interpretation of this study is my professional opinion             I have spent 20 minutes reviewing the previous notes, reviewing diagnostic studies [Advanced  Imaging, Diagnostic test results (x-rays)] and had a direct face to face with the patient discussing the diagnosis and importance of compliance with the treatment and plan. There is  discussion for the potential for surgery, answering all questions, as well as documenting patient care coordination for this individual on the day of the visit. Disclaimer:     Sections of this note are dictated using utilizing voice recognition software, which may have resulted in some phonetic based errors in grammar and contents. Even though attempts were made to correct all the mistakes, some may have been missed, and remained in the body of the document. If questions arise, please contact our department. An electronic signature was used to authenticate this note. [facility] may have a reminder for a \"due or due soon\" health maintenance. I have asked that she contact her primary care provider for follow-up on this health maintenance. There are no Patient Instructions on file for this visit. Please follow up with your PCP for any health maintenance as recommended.             Shaylee Pagan, as dictated by , Reg Méndez  5/4/2022  8:38 AM

## 2022-05-04 ENCOUNTER — OFFICE VISIT (OUTPATIENT)
Dept: ORTHOPEDIC SURGERY | Age: 67
End: 2022-05-04
Payer: COMMERCIAL

## 2022-05-04 VITALS
HEART RATE: 82 BPM | BODY MASS INDEX: 30.58 KG/M2 | HEIGHT: 63 IN | TEMPERATURE: 95.9 F | OXYGEN SATURATION: 98 % | WEIGHT: 172.6 LBS

## 2022-05-04 DIAGNOSIS — S92.354K CLOSED NONDISPLACED FRACTURE OF FIFTH METATARSAL BONE OF RIGHT FOOT WITH NONUNION, SUBSEQUENT ENCOUNTER: Primary | ICD-10-CM

## 2022-05-04 DIAGNOSIS — M77.41 METATARSALGIA OF RIGHT FOOT: ICD-10-CM

## 2022-05-04 PROCEDURE — 73630 X-RAY EXAM OF FOOT: CPT | Performed by: ORTHOPAEDIC SURGERY

## 2022-05-04 PROCEDURE — 99024 POSTOP FOLLOW-UP VISIT: CPT | Performed by: ORTHOPAEDIC SURGERY

## 2022-05-18 ENCOUNTER — OFFICE VISIT (OUTPATIENT)
Dept: FAMILY MEDICINE CLINIC | Age: 67
End: 2022-05-18
Payer: COMMERCIAL

## 2022-05-18 VITALS
WEIGHT: 170.6 LBS | OXYGEN SATURATION: 100 % | BODY MASS INDEX: 30.23 KG/M2 | RESPIRATION RATE: 16 BRPM | HEART RATE: 75 BPM | TEMPERATURE: 98.3 F | DIASTOLIC BLOOD PRESSURE: 72 MMHG | SYSTOLIC BLOOD PRESSURE: 117 MMHG | HEIGHT: 63 IN

## 2022-05-18 DIAGNOSIS — M19.90 INFLAMMATORY OSTEOARTHRITIS: ICD-10-CM

## 2022-05-18 DIAGNOSIS — M85.80 OSTEOPENIA, UNSPECIFIED LOCATION: ICD-10-CM

## 2022-05-18 DIAGNOSIS — E55.9 VITAMIN D DEFICIENCY: ICD-10-CM

## 2022-05-18 DIAGNOSIS — M54.2 NECK PAIN: Primary | ICD-10-CM

## 2022-05-18 DIAGNOSIS — E03.4 HYPOTHYROIDISM DUE TO ACQUIRED ATROPHY OF THYROID: ICD-10-CM

## 2022-05-18 DIAGNOSIS — E78.5 HYPERLIPIDEMIA, UNSPECIFIED HYPERLIPIDEMIA TYPE: ICD-10-CM

## 2022-05-18 DIAGNOSIS — K21.9 GASTROESOPHAGEAL REFLUX DISEASE, UNSPECIFIED WHETHER ESOPHAGITIS PRESENT: ICD-10-CM

## 2022-05-18 DIAGNOSIS — R73.03 PREDIABETES: ICD-10-CM

## 2022-05-18 DIAGNOSIS — N95.2 ATROPHIC VAGINITIS: ICD-10-CM

## 2022-05-18 PROCEDURE — 99214 OFFICE O/P EST MOD 30 MIN: CPT | Performed by: INTERNAL MEDICINE

## 2022-05-18 NOTE — PROGRESS NOTES
Patient seen for routine follow up with c/o sharp intermittent neck pain when turning neck, associated headaches. There are no preventive care reminders to display for this patient. 1. \"Have you been to the ER, urgent care clinic since your last visit? Hospitalized since your last visit? \" No    2. \"Have you seen or consulted any other health care providers outside of the [address] [place] [address] since your last visit? \" Yes Hand Surgeon for R. hand, Orthopedic for fx R. foot, and dental implant procedure     3. For patients aged 39-70: Has the patient had a colonoscopy / FIT/ Cologuard? Yes - no Care Gap present      If the patient is female:    4. For patients aged 41-77: Has the patient had a mammogram within the past 2 years? Yes - no Care Gap present      5. For patients aged 21-65: Has the patient had a pap smear?  NA - based on age or sex

## 2022-05-18 NOTE — PROGRESS NOTES
Assessment/ Plan:   Diagnoses and all orders for this visit:    1. Neck pain-new sx this year with popping and cracking sounds that provoke a headache and occasional numbing of the fingers; will check C spine Xrays  -     XR SPINE CERV 4 OR 5 V; Future    2. Hypothyroidism due to acquired atrophy of thyroid-will start Synthroid if TSh is above 10  -     TSH 3RD GENERATION; Future    3. Hyperlipidemia, unspecified hyperlipidemia type-goal LDL of less than 100 on Crestor  -     METABOLIC PANEL, COMPREHENSIVE; Future  -     LIPID PANEL; Future    4. Prediabetes-continue to watch diet and regular exercise  -     HEMOGLOBIN A1C WITH EAG; Future    5. Vitamin D deficiency-on OTC Calcium carbonate once daily  -     VITAMIN D, 25 HYDROXY; Future    6. Osteopenia, unspecified location-with recent ?fragility fractures-continue with Fosamax and order Dexa for the fall-may consider other meds like Reclast if bone density decreased    7. Atrophic vaginitis-on Vagifem    8. Gastroesophageal reflux disease, unspecified whether esophagitis present-on daily PPI  -     CBC WITH AUTOMATED DIFF; Future    9. Inflammatory osteoarthritis-on Plaquenil c/o rheum; eye exam is current    10. Working with Dr Sourav Bonds re finger fracture right right finger  Working with Podiatry re right foot fracture  Doing some exercise for right bicipital tendinitis-if not improved, will consult with Dr Pedro Carreon and Jazzy    · Return in about 6 months (around 11/18/2022) for follow up. Chief Complaint   Patient presents with    Follow Up Chronic Condition    Neck Pain       Pt is a 75y.o. year old female who presents for follow up of her chronic medical problems    There are no preventive care reminders to display for this patient.      Wt Readings from Last 3 Encounters:   05/18/22 170 lb 9.6 oz (77.4 kg)   05/04/22 172 lb 9.6 oz (78.3 kg)   03/16/22 172 lb 9.6 oz (78.3 kg)       BP Readings from Last 3 Encounters: 05/18/22 117/72   10/27/21 124/72   04/28/21 113/74       Fasting? yes      Hard time typing    Restarted fosamax back     Plaquenil made a difference  Occasional SI problems      New issue: neck pops and cracks , arms go numb; gets a headache    This year  Old cervical injury from [address] [place] [address] Road back with some changes as well  ROS:    Pt denies: Wt loss, Fever/Chills, HA, Visual changes, Fatigue, Chest pain, SOB, ROBERTS, Abd pain, N/V/D/C, Blood in stool or urine, Edema. Pertinent positive as above in HPI. All others were negative    Patient Active Problem List   Diagnosis Code    Hyperlipidemia E78.5    Vitamin D deficiency E55.9    Inflammatory osteoarthritis M19.90    Colon polyp K63.5    Family history of osteoporosis Z82.62    Hypothyroidism due to acquired atrophy of thyroid E03.4    Osteopenia M85.80    Advance directive discussed with patient Z70.80    Gastroesophageal reflux disease K21.9    Overweight (BMI 25.0-29. 9) E66.3    Atrophic vaginitis N95.2       Past Medical History:   Diagnosis Date    Anemia NEC     as a young child    Arthritis     GERD (gastroesophageal reflux disease)     Hypercholesterolemia     Hypothyroidism due to acquired atrophy of thyroid 4/22/2015       Current Outpatient Medications   Medication Sig Dispense Refill    pantoprazole (PROTONIX) 40 mg tablet TAKE 1 TABLET BY MOUTH ONE TIME A DAY 90 Tablet 2    rosuvastatin (CRESTOR) 20 mg tablet TAKE ONE TABLET BY MOUTH NIGHTLY 90 Tablet 3    therapeutic multivitamin-minerals (THERAGRAN-M) tablet Take 1 Tablet by mouth daily.  coenzyme Q-10 (CO Q-10) 30 mg capsule Take 200 mg by mouth.  vitamin E (AQUA GEMS) 400 unit capsule Take 400 Units by mouth daily.  alendronate (FOSAMAX) 70 mg tablet Take 1 Tablet by mouth every seven (7) days. 12 Tablet 3    estradioL (VAGIFEM) 10 mcg tab vaginal tablet INSERT 1 TABLET INTO VAGINA EVERY TUESDAY AND FRIDAY.  DISCARD PREVIOUS ORDER 24 Tablet 3    hydrOXYchloroQUINE (Plaquenil) 200 mg tablet Take 400 mg by mouth daily.  cholecalciferol, vitamin D3, (VITAMIN D3) 2,000 unit Tab Take 5,000 Units by mouth.  loratadine (CLARITIN) 10 mg tablet Take 10 mg by mouth daily.  acetaminophen (TYLENOL) 325 mg tablet Take  by mouth every four (4) hours as needed. Social History     Tobacco Use   Smoking Status Never Smoker   Smokeless Tobacco Never Used       Allergies   Allergen Reactions    Azulfidine [Sulfasalazine] Rash    Arava [Leflunomide] Other (comments)     Elevated liver enzymes    Iodinated Contrast Media Other (comments)     Severe overall skin reaction    Prilosec [Omeprazole Magnesium] Nausea Only       Patient Labs were reviewed: yes    Patient Past Records were reviewed: yes      Objective:     Vitals:    05/18/22 0832   BP: 117/72   Pulse: 75   Resp: 16   Temp: 98.3 °F (36.8 °C)   TempSrc: Temporal   SpO2: 100%   Weight: 170 lb 9.6 oz (77.4 kg)   Height: 5' 3\" (1.6 m)     Body mass index is 30.22 kg/m². Exam:   Appearance: alert, well appearing,  oriented to person, place, and time, acyanotic, in no respiratory distress and well hydrated. HEENT:  NC/AT, pink conj, anicteric sclerae  Neck:  No cervical lymphadenopathy, no JVD, no thyromegaly, no carotid bruit; no reproducible pain along the C spine and has FROM of the beck  Heart:  RRR without M/R/G  Lungs:  CTAB, no rhonchi, rales, or wheezes with good air exchange   Abdomen:  Non-tender, pos bowel sounds, no hepatosplenomegaly  Ext:  No C/C/E, bony fingers; some joint swelling on the PIPs and DIPs, crepitus on both knees; pain of abduction of the right shoulder    Skin: no rash  Neuro: no lateralizing signs, CNs II-XII intact            I have discussed the diagnosis with the patient and the intended plan as seen in the above orders. The patient has received an After-Visit Summary and questions were answered concerning future plans.      Medication Side Effects and Warnings were discussed with patient: yes    Patient verbalized understanding of above instructions.     Laura Barry MD  Internal Medicine  [facility]

## 2022-05-18 NOTE — PATIENT INSTRUCTIONS
Neck Arthritis: Exercises  Introduction  Here are some examples of exercises for you to try. The exercises may be suggested for a condition or for rehabilitation. Start each exercise slowly. Ease off the exercises if you start to have pain. You will be told when to start these exercises and which ones will work best for you. How to do the exercises  Neck stretches to the side    1. This stretch works best if you keep your shoulder down as you lean away from it. To help you remember to do this, start by relaxing your shoulders and lightly holding on to your thighs or your chair. 2. Tilt your head toward your shoulder and hold for 15 to 30 seconds. Let the weight of your head stretch your muscles. 3. Repeat 2 to 4 times toward each shoulder. Chin tuck    1. Lie on the floor with a rolled-up towel under your neck. Your head should be touching the floor. 2. Slowly bring your chin toward your chest.  3. Hold for a count of 6, and then relax for up to 10 seconds. 4. Repeat 8 to 12 times. Active cervical rotation    1. Sit in a firm chair, or stand up straight. 2. Keeping your chin level, turn your head to the right, and hold for 15 to 30 seconds. 3. Turn your head to the left and hold for 15 to 30 seconds. 4. Repeat 2 to 4 times to each side. Shoulder blade squeeze    1. While standing, squeeze your shoulder blades together. 2. Do not raise your shoulders up as you are squeezing. 3. Hold for 6 seconds. 4. Repeat 8 to 12 times. Shoulder rolls    1. Sit comfortably with your feet shoulder-width apart. You can also do this exercise standing up. 2. Roll your shoulders up, then back, and then down in a smooth, circular motion. 3. Repeat 2 to 4 times. Follow-up care is a key part of your treatment and safety. Be sure to make and go to all appointments, and call your doctor if you are having problems. It's also a good idea to know your test results and keep a list of the medicines you take.   Where can you learn more? Go to http://www.gray.com/  Enter G739 in the search box to learn more about \"Neck Arthritis: Exercises. \"  Current as of: July 1, 2021               Content Version: 13.2  © 5942-0753 [employer], [employer]. Care instructions adapted under license by [employer] (which disclaims liability or warranty for this information). If you have questions about a medical condition or this instruction, always ask your healthcare professional. [place] [address] any warranty or liability for your use of this information.

## 2022-05-19 LAB
25(OH)D3+25(OH)D2 SERPL-MCNC: 52.7 NG/ML (ref 30–100)
ALBUMIN SERPL-MCNC: 4.5 G/DL (ref 3.8–4.8)
ALBUMIN/GLOB SERPL: 2.5 {RATIO} (ref 1.2–2.2)
ALP SERPL-CCNC: 67 IU/L (ref 44–121)
ALT SERPL-CCNC: 24 IU/L (ref 0–32)
AST SERPL-CCNC: 30 IU/L (ref 0–40)
BASOPHILS # BLD AUTO: 0 X10E3/UL (ref 0–0.2)
BASOPHILS NFR BLD AUTO: 1 %
BILIRUB SERPL-MCNC: <0.2 MG/DL (ref 0–1.2)
BUN SERPL-MCNC: 19 MG/DL (ref 8–27)
BUN/CREAT SERPL: 21 (ref 12–28)
CALCIUM SERPL-MCNC: 9.3 MG/DL (ref 8.7–10.3)
CHLORIDE SERPL-SCNC: 104 MMOL/L (ref 96–106)
CHOLEST SERPL-MCNC: 174 MG/DL (ref 100–199)
CO2 SERPL-SCNC: 24 MMOL/L (ref 20–29)
CREAT SERPL-MCNC: 0.91 MG/DL (ref 0.57–1)
EGFR: 69 ML/MIN/1.73
EOSINOPHIL # BLD AUTO: 0.2 X10E3/UL (ref 0–0.4)
EOSINOPHIL NFR BLD AUTO: 2 %
ERYTHROCYTE [DISTWIDTH] IN BLOOD BY AUTOMATED COUNT: 14 % (ref 11.7–15.4)
EST. AVERAGE GLUCOSE BLD GHB EST-MCNC: 120 MG/DL
GLOBULIN SER CALC-MCNC: 1.8 G/DL (ref 1.5–4.5)
GLUCOSE SERPL-MCNC: 99 MG/DL (ref 65–99)
HBA1C MFR BLD: 5.8 % (ref 4.8–5.6)
HCT VFR BLD AUTO: 40.2 % (ref 34–46.6)
HDLC SERPL-MCNC: 77 MG/DL
HGB BLD-MCNC: 12.7 G/DL (ref 11.1–15.9)
IMM GRANULOCYTES # BLD AUTO: 0 X10E3/UL (ref 0–0.1)
IMM GRANULOCYTES NFR BLD AUTO: 0 %
IMP & REVIEW OF LAB RESULTS: NORMAL
LDLC SERPL CALC-MCNC: 85 MG/DL (ref 0–99)
LYMPHOCYTES # BLD AUTO: 2 X10E3/UL (ref 0.7–3.1)
LYMPHOCYTES NFR BLD AUTO: 30 %
MCH RBC QN AUTO: 26.6 PG (ref 26.6–33)
MCHC RBC AUTO-ENTMCNC: 31.6 G/DL (ref 31.5–35.7)
MCV RBC AUTO: 84 FL (ref 79–97)
MONOCYTES # BLD AUTO: 0.6 X10E3/UL (ref 0.1–0.9)
MONOCYTES NFR BLD AUTO: 9 %
NEUTROPHILS # BLD AUTO: 3.9 X10E3/UL (ref 1.4–7)
NEUTROPHILS NFR BLD AUTO: 58 %
PLATELET # BLD AUTO: 200 X10E3/UL (ref 150–450)
POTASSIUM SERPL-SCNC: 4.6 MMOL/L (ref 3.5–5.2)
PROT SERPL-MCNC: 6.3 G/DL (ref 6–8.5)
RBC # BLD AUTO: 4.77 X10E6/UL (ref 3.77–5.28)
SODIUM SERPL-SCNC: 140 MMOL/L (ref 134–144)
TRIGL SERPL-MCNC: 65 MG/DL (ref 0–149)
TSH SERPL DL<=0.005 MIU/L-ACNC: 2.88 UIU/ML (ref 0.45–4.5)
VLDLC SERPL CALC-MCNC: 12 MG/DL (ref 5–40)
WBC # BLD AUTO: 6.6 X10E3/UL (ref 3.4–10.8)

## 2022-06-07 NOTE — PROGRESS NOTES
AMBULATORY PROGRESS NOTE      Patient: Cindy Epps             MRN: [id number]     SSN: [id number] Body mass index is 30.82 kg/m². YOB: 1955     AGE: 75 y.o. EX: female    PCP: Kayla Preston MD       IMPRESSION //  DIAGNOSIS AND TREATMENT PLAN      Cindy Epps has a diagnosis of: The orthotic needs to be adjusted. It is too much medial posting causing lateral column overload. We will have this modified accordingly. Otherwise she is doing better, without the orthotic, currently. DIAGNOSES    1. 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 Right foot pain    3. Metatarsalgia of right foot        Orders Placed This Encounter    Generic Supply Order     Orthotic modification: must decrease medial height to avoid varus/supination/lateral overload of the foot    [98866] Foot Min 3V     Order Specific Question:   Weight bearing? Answer:   No          PLAN:    1. DME: Orthotic modification: must decrease medial height to avoid varus/supination/lateral overload of the foot  2. Obtain 3 view RT foot XR  3. AVS: Deleta Law, Jamie series, Dejan 7 series/Skechers Shape Ups/MBT    RTO 8 weeks    Patient Instructions   Shoes:    Deleta Law, Jamie series, Dejan 7 series  Skechers Shape Ups  MBT          Please follow up with your PCP for any health maintenance as recommended         Cindy Epps  expresses understanding of the diagnosis, treatment plan, and all of their proposed questions were answered to their satisfaction. Patient education has been provided re the diagnoses.          HPI //  [facility] IS A 75 y.o. female who is an established patient, presenting to my outpatient office for evaluation of  the following chief complaint(s):     Chief Complaint   Patient presents with    Foot Pain     right foot pain       At LOV, MS NELI HONEYCUTT continued to endorse right foot pain. 3-View XR of right foot obtained. DME: Custom Trilaminar Orthotic w/ met pad: goal to offload 2nd metatarsal.    Since LOV, MS NELI HONEYCUTT continues to endorse lateral right foot pain onset 1 week ago. She states that her pain is exacerbated by her trilaminar orthotic. She reports minimal pain 2 weeks ago after light hiking in [facility]. Visit Vitals  Pulse 85   Temp 97.8 °F (36.6 °C) (Temporal)   Resp 16   Ht 5' 3\" (1.6 m)   Wt 174 lb (78.9 kg)   SpO2 99%   BMI 30.82 kg/m²       Appearance: Alert, well appearing and pleasant patient who is in no distress, oriented to person, place/time, and who follows commands. Normal dress/motor activity/thought processes/memory. This patient is accompanied in the examination room by her  self. Patient arrives to office via: with assistive device: custom trilaminar orthotic    Psychiatric:  Normal Affect/mood. Judgement, behavior, and conduct are appropriate. Speech normal in context and clarity, memory intact grossly, no involuntary movements - tremors. H EENT (2): Head normocephalic & atraumatic. Eye: pupils are round// EOM are intact // Neck: ROM WNL  // Hearings Intact   Respiratory: Breathing non labored     ANKLE/FOOT right    Gait: normal  Tenderness: mild lateral plantar  Cutaneous: WNL. Joint Motion: WNL   Joint / Tendon Stability:  No Ankle or Subtalar instability or joint laxity. No peroneal sublux ability or dislocation  Alignment: neutral Hindfoot,    Neuro Motor/Sensory: NL/NL  Vascular: NL foot/ankle pulses,   Lymphatics: No extremity lymphedema, No calf swelling, no tenderness to calf muscles. CHART REVIEW     MS NELI HONEYCUTT has been experiencing pain and discomfort confirmed as outlined in the pain assessment outlined below.  was reviewed by Mela Sharpe MD, on 06/08/2022.     Pain Assessment  6/8/2022   Location of Pain Foot   Location Modifiers Right   Severity of Pain 2   Quality of Pain Aching   Duration of Pain Persistent   Frequency of Pain Constant   Aggravating Factors Other (Comment)   Aggravating Factors Comment new shoe inserts   Limiting Behavior Yes   Relieving Factors NSAID   Result of Injury No        Monico Kehr  has a past medical history of Anemia NEC, Arthritis, GERD (gastroesophageal reflux disease), Hypercholesterolemia, and Hypothyroidism due to acquired atrophy of thyroid (4/22/2015). She has no past medical history of Arrhythmia, Asthma, Autoimmune disease ([address] CAD (coronary artery disease), Calculus of kidney, Cancer ([address] Chronic kidney disease, Chronic pain, Congestive heart failure, unspecified, Contact dermatitis and other eczema, due to unspecified cause, COPD, Depression, Diabetes ([address] Headache(784.0), Hypertension, Liver disease, PUD (peptic ulcer disease), Seizures ([address] Stroke ([address] or Thromboembolus ([address] Patients is employed at:          has a past medical history of Anemia NEC, Arthritis, GERD (gastroesophageal reflux disease), Hypercholesterolemia, and Hypothyroidism due to acquired atrophy of thyroid (4/22/2015). She has no past medical history of Arrhythmia, Asthma, Autoimmune disease ([address] CAD (coronary artery disease), Calculus of kidney, Cancer ([address] Chronic kidney disease, Chronic pain, Congestive heart failure, unspecified, Contact dermatitis and other eczema, due to unspecified cause, COPD, Depression, Diabetes ([address] Headache(784.0), Hypertension, Liver disease, PUD (peptic ulcer disease), Seizures ([address] Stroke ([address] or Thromboembolus ([address] has a past surgical history that includes hx tonsillectomy; hx gyn; hx wrist fracture tx (09/01/2017); hand/finger surgery unlisted (2017); hand/finger surgery unlisted; hand/finger surgery unlisted; hand/finger surgery unlisted; and hand/finger surgery unlisted.    family history includes Dementia in her mother; Diabetes in her mother; Heart Disease in her father; Hypertension in her mother; Lung Disease in her mother. Current Outpatient Medications   Medication Sig    pantoprazole (PROTONIX) 40 mg tablet TAKE 1 TABLET BY MOUTH ONE TIME A DAY    rosuvastatin (CRESTOR) 20 mg tablet TAKE ONE TABLET BY MOUTH NIGHTLY    therapeutic multivitamin-minerals (THERAGRAN-M) tablet Take 1 Tablet by mouth daily.  coenzyme Q-10 (CO Q-10) 30 mg capsule Take 200 mg by mouth.  vitamin E (AQUA GEMS) 400 unit capsule Take 400 Units by mouth daily.  alendronate (FOSAMAX) 70 mg tablet Take 1 Tablet by mouth every seven (7) days.  estradioL (VAGIFEM) 10 mcg tab vaginal tablet INSERT 1 TABLET INTO VAGINA EVERY TUESDAY AND FRIDAY. DISCARD PREVIOUS ORDER    hydrOXYchloroQUINE (Plaquenil) 200 mg tablet Take 400 mg by mouth daily.  cholecalciferol, vitamin D3, (VITAMIN D3) 2,000 unit Tab Take 5,000 Units by mouth.  loratadine (CLARITIN) 10 mg tablet Take 10 mg by mouth daily.  acetaminophen (TYLENOL) 325 mg tablet Take  by mouth every four (4) hours as needed. No current facility-administered medications for this visit. Allergies   Allergen Reactions    Azulfidine [Sulfasalazine] Rash    Arava [Leflunomide] Other (comments)     Elevated liver enzymes    Iodinated Contrast Media Other (comments)     Severe overall skin reaction    Prilosec [Omeprazole Magnesium] Nausea Only     Social History     Occupational History    Not on file   Tobacco Use    Smoking status: Never Smoker    Smokeless tobacco: Never Used   Vaping Use    Vaping Use: Never used   Substance and Sexual Activity    Alcohol use: No    Drug use: No    Sexual activity: Not on file       reports that she has never smoked. She has never used smokeless tobacco. She reports that she does not drink alcohol and does not use drugs.       DIAGNOSTIC LAB DATA      Lab Results   Component Value Date/Time    Hemoglobin A1c 5.8 (H) 05/18/2022 09:24 AM    Hemoglobin A1c 5.9 (H) 10/27/2021 12:00 AM    Hemoglobin A1c 6.0 (H) 04/28/2021 12:00 AM    //   Lab Results   Component Value Date/Time    Glucose 99 05/18/2022 09:24 AM        No results found for: [id number], [id number]      Lab Results   Component Value Date/Time    Vitamin D 25-Hydroxy 30.3 05/01/2020 08:20 AM    VITAMIN D, 25-HYDROXY 52.7 05/18/2022 09:24 AM        Drug Screen Most Recent Result Date    No resulted procedures found. REVIEW OF SYSTEMS : 6/8/2022  ALL BELOW ARE Negative except : SEE HPI     All other systems reviewed and are negative. 12 point review of systems otherwise negative unless noted in HPI. RADIOGRAPHS// IMAGING//DIAGNOSTIC DATA     Orders Placed This Encounter    Generic Supply Order    [25743] Foot Min 3V        No notes on file      right foot X-rays, 3 views, AP/LAT/OBL completed 6/8/2022 AT [address] [place] [address]    X-rays reveal healed 2nd met base fx//there are no dislocation or subluxation noted. Overall alignment is acceptable. Soft tissue swelling is  noted. No osteolytic or osteoblastic lesions noted. Mineralization suggests yes osteopenia. Degenerative changes are not noted. Calcified vessels are not present. I have personally reviewed the images of the above study. The interpretation of this study is my professional opinion           I have spent 15 minutes reviewing the previous notes, reviewing diagnostic studies [Advanced  Imaging, Diagnostic test results (x-rays)] and had a direct face to face with the patient discussing the diagnosis and importance of compliance with the treatment and plan. There is  discussion for the potential for surgery, answering all questions, as well as documenting patient care coordination for this individual on the day of the visit. Disclaimer:     Sections of this note are dictated using utilizing voice recognition software, which may have resulted in some phonetic based errors in grammar and contents. Even though attempts were made to correct all the mistakes, some may have been missed, and remained in the body of the document. If questions arise, please contact our department. An electronic signature was used to authenticate this note. MS QUINTEROS [facility] may have a reminder for a \"due or due soon\" health maintenance. I have asked that she contact her primary care provider for follow-up on this health maintenance. Patient Instructions   Shoes:    Jeffery Slim series, Dejan 7 series  Skechers Shape Ups  MBT            Please follow up with your PCP for any health maintenance as recommended.                 Written by Ronel Kohler, as dictated by Tim Plasencia MD.   6/8/2022  5:02 PM

## 2022-06-08 ENCOUNTER — OFFICE VISIT (OUTPATIENT)
Dept: ORTHOPEDIC SURGERY | Age: 67
End: 2022-06-08
Payer: COMMERCIAL

## 2022-06-08 VITALS
HEIGHT: 63 IN | BODY MASS INDEX: 30.83 KG/M2 | HEART RATE: 85 BPM | OXYGEN SATURATION: 99 % | TEMPERATURE: 97.8 F | RESPIRATION RATE: 16 BRPM | WEIGHT: 174 LBS

## 2022-06-08 DIAGNOSIS — M77.41 METATARSALGIA OF RIGHT FOOT: ICD-10-CM

## 2022-06-08 DIAGNOSIS — M79.671 RIGHT FOOT PAIN: ICD-10-CM

## 2022-06-08 DIAGNOSIS — S92.354K CLOSED NONDISPLACED FRACTURE OF FIFTH METATARSAL BONE OF RIGHT FOOT WITH NONUNION, SUBSEQUENT ENCOUNTER: Primary | ICD-10-CM

## 2022-06-08 PROCEDURE — 99213 OFFICE O/P EST LOW 20 MIN: CPT | Performed by: ORTHOPAEDIC SURGERY

## 2022-06-08 PROCEDURE — 73630 X-RAY EXAM OF FOOT: CPT | Performed by: ORTHOPAEDIC SURGERY

## 2022-06-08 PROCEDURE — 1123F ACP DISCUSS/DSCN MKR DOCD: CPT | Performed by: ORTHOPAEDIC SURGERY

## 2022-08-10 ENCOUNTER — OFFICE VISIT (OUTPATIENT)
Dept: ORTHOPEDIC SURGERY | Age: 67
End: 2022-08-10
Payer: COMMERCIAL

## 2022-08-10 VITALS — BODY MASS INDEX: 30.83 KG/M2 | WEIGHT: 174 LBS | TEMPERATURE: 97.7 F | HEIGHT: 63 IN

## 2022-08-10 DIAGNOSIS — S92.354K CLOSED NONDISPLACED FRACTURE OF FIFTH METATARSAL BONE OF RIGHT FOOT WITH NONUNION, SUBSEQUENT ENCOUNTER: Primary | ICD-10-CM

## 2022-08-10 DIAGNOSIS — S92.324A CLOSED NONDISPLACED FRACTURE OF SECOND METATARSAL BONE OF RIGHT FOOT, INITIAL ENCOUNTER: ICD-10-CM

## 2022-08-10 PROCEDURE — 73630 X-RAY EXAM OF FOOT: CPT | Performed by: ORTHOPAEDIC SURGERY

## 2022-08-10 PROCEDURE — 1123F ACP DISCUSS/DSCN MKR DOCD: CPT | Performed by: ORTHOPAEDIC SURGERY

## 2022-08-10 PROCEDURE — 99212 OFFICE O/P EST SF 10 MIN: CPT | Performed by: ORTHOPAEDIC SURGERY

## 2022-08-10 NOTE — PROGRESS NOTES
AMBULATORY PROGRESS NOTE      Patient: Panfilo Anaya             MRN: [id number]     SSN: [id number] Body mass index is 30.82 kg/m². YOB: 1955     AGE: 75 y.o. EX: female    PCP: Lori Salazar MD       IMPRESSION //  DIAGNOSIS AND TREATMENT PLAN      Panfilo Anaya has a diagnosis of:      She continues have pain and reproducible point tenderness on evaluation, to the right forefoot specifically palpated the distal 1/3-second metatarsal, distal one third third metatarsal regions. It is to be noted she does have osteopenia and osteoporosis, does have a history of stress fractures and has a history of a remote fourth metatarsal fracture. She is currently being treated for a proximal 1/3-second metatarsal fracture. X-rays today, 3 views, right foot, nonweightbearing, AP, lateral oblique images: These were reviewed, and I showed her these images. It shows a healed right fourth metatarsal fracture, continued healing to the second tarsal.    She still having pain when she takes a stride, still has pain when she pushes off. Despite wearing an orthopedic shoe, and a custom trilaminar offloading orthotic. Continued reproducible point tenderness  distal one third of second and third metatarsal regions, recommendations, for MRI. MRI  Did document that amount of healing that she has the second metatarsal base, but also see if there is any complication of stress fracture to the second and/or third metatarsal regions and his energy level has osteoporosis. MRI has prognostic value as this will allow me to determine the percentage that she is healed, as well as  assessing fort occult additional fracture propagation. DIAGNOSES    1. 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  Closed nondisplaced fracture of second metatarsal bone of right foot, initial encounter        Orders Placed This Encounter    AMB POC XRAY, FOOT; COMPLETE, 3+ VIEW     ASK ALL FEMALE PATIENTS IF PREGNANT     Order Specific Question:   Reason for Exam     Answer:   PAIN    MRI FOOT RT WO CONT     Standing Status:   Future     Standing Expiration Date:   9/10/2022     Scheduling Instructions:      Pt prefers a Wednesday or Friday     Order Specific Question:   Region of foot     Answer: Fore          PLAN:    1. I obtained right foot 3V XR in the office today. 2. I will order a right forefoot MRI. RTO after MRI    There are no Patient Instructions on file for this visit. Please follow up with your PCP for any health maintenance as recommended         [facility]  expresses understanding of the diagnosis, treatment plan, and all of their proposed questions were answered to their satisfaction. Patient education has been provided re the diagnoses. HPI //  [address] IS A 75 y.o. female who is a/an  established patient, presenting to my outpatient office for evaluation of  the following chief complaint(s):     Chief Complaint   Patient presents with    Foot Pain     Rt        At [address] [place] [address], [facility] presented with nonunion of a right 5th metatarsal fracture. I obtained right foot 3V XR in the office. I prescribed orthotic modification: must decrease medial height to avoid varus/supination/lateral overload of the foot. I recommended rocker bottom shoe brands for her to try. Since Taylor Mcdonald continues to endorse right foot pain. She complains of pain in the right central-to-lateral forefoot with weightbearing. She admits to offloading her foot laterally with ambulation. She reports some relief with her modified orthotic. She notes that she is able to walk up to 2 miles sometimes with her orthotic.     Visit Vitals  Temp 97.7 °F (36.5 °C) (Temporal)   Ht 5' 3\" (1.6 m)   Wt 174 lb (78.9 kg)   BMI 30.82 kg/m²       Appearance: Alert, well appearing and pleasant patient who is in no distress, oriented to person, place/time, and who follows commands. Normal dress/motor activity/thought processes/memory. This patient is accompanied in the examination room by her  self. Patient arrives to office via: with assistive device: custom trilaminar orthotic, right    Psychiatric:  Normal Affect/mood. Judgement, behavior, and conduct are appropriate. Speech normal in context and clarity, memory intact grossly, no involuntary movements - tremors. H EENT (2): Head normocephalic & atraumatic. Eye: pupils are round// EOM are intact // Neck: ROM WNL  // Hearings Intact   Respiratory: Breathing non labored     ANKLE/FOOT right    Gait: uses assistive device - custom trilaminar orthotic, right  Tenderness: mild over the 3rd metatarsal distal one-third, and 4th metatarsal head. Cutaneous: WNL. Joint Motion: WNL   Joint / Tendon Stability:  No Ankle or Subtalar instability or joint laxity. No peroneal sublux ability or dislocation  Alignment: neutral Hindfoot,    Neuro Motor/Sensory: NL/NL  Vascular: NL foot/ankle pulses,   Lymphatics: No extremity lymphedema, No calf swelling, no tenderness to calf muscles. CHART REVIEW     Ferny Montero has been experiencing pain and discomfort confirmed as outlined in the pain assessment outlined below.  was reviewed by Sharon Jolley MD, on 8/10/2022.      Pain Assessment  8/10/2022   Location of Pain Foot   Location Modifiers Right   Severity of Pain 1   Quality of Pain Aching;Dull   Duration of Pain Persistent   Frequency of Pain Intermittent   Date Pain First Started (No Data)   Date Pain First Started Comment f.u   Aggravating Factors Walking   Aggravating Factors Comment -   Limiting Behavior No   Relieving Factors Nothing   Result of Injury No        [address]  has a past medical history of Anemia NEC, Arthritis, GERD (gastroesophageal reflux disease), Hypercholesterolemia, and Hypothyroidism due to acquired atrophy of thyroid (4/22/2015). She has no past medical history of Arrhythmia, Asthma, Autoimmune disease ([address] CAD (coronary artery disease), Calculus of kidney, Cancer ([address] Chronic kidney disease, Chronic pain, Congestive heart failure, unspecified, Contact dermatitis and other eczema, due to unspecified cause, COPD, Depression, Diabetes ([address] Headache(784.0), Hypertension, Liver disease, PUD (peptic ulcer disease), Seizures ([address] Stroke ([address] or Thromboembolus ([address] Patients is employed at:          has a past medical history of Anemia NEC, Arthritis, GERD (gastroesophageal reflux disease), Hypercholesterolemia, and Hypothyroidism due to acquired atrophy of thyroid (4/22/2015). She has no past medical history of Arrhythmia, Asthma, Autoimmune disease ([address] CAD (coronary artery disease), Calculus of kidney, Cancer ([address] Chronic kidney disease, Chronic pain, Congestive heart failure, unspecified, Contact dermatitis and other eczema, due to unspecified cause, COPD, Depression, Diabetes ([address] Headache(784.0), Hypertension, Liver disease, PUD (peptic ulcer disease), Seizures ([address] Stroke ([address] or Thromboembolus ([address] has a past surgical history that includes hx tonsillectomy; hx gyn; hx wrist fracture tx (09/01/2017); hand/finger surgery unlisted (2017); hand/finger surgery unlisted; hand/finger surgery unlisted; hand/finger surgery unlisted; and hand/finger surgery unlisted. family history includes Dementia in her mother; Diabetes in her mother; Heart Disease in her father; Hypertension in her mother; Lung Disease in her mother. Current Outpatient Medications   Medication Sig    pantoprazole (PROTONIX) 40 mg tablet TAKE 1 TABLET BY MOUTH ONE TIME A DAY    rosuvastatin (CRESTOR) 20 mg tablet TAKE ONE TABLET BY MOUTH NIGHTLY    therapeutic multivitamin-minerals (THERAGRAN-M) tablet Take 1 Tablet by mouth daily.     coenzyme Q-10 (CO Q-10) 30 mg capsule Take 200 mg by mouth.    vitamin E (AQUA GEMS) 400 unit capsule Take 400 Units by mouth daily. alendronate (FOSAMAX) 70 mg tablet Take 1 Tablet by mouth every seven (7) days. estradioL (VAGIFEM) 10 mcg tab vaginal tablet INSERT 1 TABLET INTO VAGINA EVERY TUESDAY AND FRIDAY. DISCARD PREVIOUS ORDER    hydrOXYchloroQUINE (PLAQUENIL) 200 mg tablet Take 400 mg by mouth daily. cholecalciferol, vitamin D3, 50 mcg (2,000 unit) tab Take 5,000 Units by mouth.    loratadine (CLARITIN) 10 mg tablet Take 10 mg by mouth daily. acetaminophen (TYLENOL) 325 mg tablet Take  by mouth every four (4) hours as needed. No current facility-administered medications for this visit. Allergies   Allergen Reactions    Azulfidine [Sulfasalazine] Rash    Arava [Leflunomide] Other (comments)     Elevated liver enzymes    Iodinated Contrast Media Other (comments)     Severe overall skin reaction    Prilosec [Omeprazole Magnesium] Nausea Only     Social History     Occupational History    Not on file   Tobacco Use    Smoking status: Never    Smokeless tobacco: Never   Vaping Use    Vaping Use: Never used   Substance and Sexual Activity    Alcohol use: No    Drug use: No    Sexual activity: Not on file       reports that she has never smoked. She has never used smokeless tobacco. She reports that she does not drink alcohol and does not use drugs. DIAGNOSTIC LAB DATA      Lab Results   Component Value Date/Time    Hemoglobin A1c 5.8 (H) 05/18/2022 09:24 AM    Hemoglobin A1c 5.9 (H) 10/27/2021 12:00 AM    Hemoglobin A1c 6.0 (H) 04/28/2021 12:00 AM    //   Lab Results   Component Value Date/Time    Glucose 99 05/18/2022 09:24 AM        No results found for: [id number], [id number]      Lab Results   Component Value Date/Time    Vitamin D 25-Hydroxy 30.3 05/01/2020 08:20 AM    VITAMIN D, 25-HYDROXY 52.7 05/18/2022 09:24 AM        Drug Screen Most Recent Result Date    No resulted procedures found.            REVIEW OF SYSTEMS : 8/10/2022  ALL BELOW ARE Negative except : SEE HPI     All other systems reviewed and are negative. 12 point review of systems otherwise negative unless noted in HPI. RADIOGRAPHS// IMAGING//DIAGNOSTIC DATA     Orders Placed This Encounter    AMB POC XRAY, FOOT; COMPLETE, 3+ VIEW    MRI FOOT RT WO CONT            X-rays today, 3 views, right foot, nonweightbearing, AP, lateral oblique images: These were reviewed, and I showed her these images. It shows a healed right fourth metatarsal fracture, continued healing to the second tarsal.       I have personally reviewed the images of the above study. The interpretation of this study is my professional opinion           I have spent 20 minutes reviewing the previous notes, reviewing diagnostic studies [Advanced  Imaging, Diagnostic test results (x-rays)] and had a direct face to face with the patient discussing the diagnosis and importance of compliance with the treatment and plan. There is  discussion for the potential for surgery, answering all questions, as well as documenting patient care coordination for this individual on the day of the visit. Disclaimer:     Sections of this note are dictated using utilizing voice recognition software, which may have resulted in some phonetic based errors in grammar and contents. Even though attempts were made to correct all the mistakes, some may have been missed, and remained in the body of the document. If questions arise, please contact our department. An electronic signature was used to authenticate this note. [facility] may have a reminder for a \"due or due soon\" health maintenance. I have asked that she contact her primary care provider for follow-up on this health maintenance. There are no Patient Instructions on file for this visit. Please follow up with your PCP for any health maintenance as recommended.                 Scribed by Maik Saldana, as dictated by Arnold George MD.   8/10/2022  7:20 AM

## 2022-08-21 DIAGNOSIS — N95.2 ATROPHIC VAGINITIS: ICD-10-CM

## 2022-08-22 RX ORDER — ESTRADIOL 10 UG/1
INSERT VAGINAL
Qty: 24 TABLET | Refills: 3 | Status: SHIPPED | OUTPATIENT
Start: 2022-08-22

## 2022-08-24 ENCOUNTER — HOSPITAL ENCOUNTER (OUTPATIENT)
Age: 67
Discharge: HOME | End: 2022-08-24
Attending: ORTHOPAEDIC SURGERY
Payer: COMMERCIAL

## 2022-08-24 DIAGNOSIS — S92.354K CLOSED NONDISPLACED FRACTURE OF FIFTH METATARSAL BONE OF RIGHT FOOT WITH NONUNION, SUBSEQUENT ENCOUNTER: ICD-10-CM

## 2022-08-24 DIAGNOSIS — S92.324A CLOSED NONDISPLACED FRACTURE OF SECOND METATARSAL BONE OF RIGHT FOOT, INITIAL ENCOUNTER: ICD-10-CM

## 2022-08-24 PROCEDURE — 73718 MRI LOWER EXTREMITY W/O DYE: CPT

## 2022-10-21 ENCOUNTER — OFFICE VISIT (OUTPATIENT)
Dept: ORTHOPEDIC SURGERY | Age: 67
End: 2022-10-21
Payer: COMMERCIAL

## 2022-10-21 VITALS — WEIGHT: 173.6 LBS | RESPIRATION RATE: 16 BRPM | HEIGHT: 64 IN | BODY MASS INDEX: 29.64 KG/M2

## 2022-10-21 DIAGNOSIS — M17.11 PRIMARY LOCALIZED OSTEOARTHRITIS OF RIGHT KNEE: Primary | ICD-10-CM

## 2022-10-21 PROCEDURE — 20610 DRAIN/INJ JOINT/BURSA W/O US: CPT | Performed by: ORTHOPAEDIC SURGERY

## 2022-10-21 PROCEDURE — 99213 OFFICE O/P EST LOW 20 MIN: CPT | Performed by: ORTHOPAEDIC SURGERY

## 2022-10-21 PROCEDURE — 1123F ACP DISCUSS/DSCN MKR DOCD: CPT | Performed by: ORTHOPAEDIC SURGERY

## 2022-10-21 PROCEDURE — 73564 X-RAY EXAM KNEE 4 OR MORE: CPT | Performed by: ORTHOPAEDIC SURGERY

## 2022-10-21 RX ORDER — TRIAMCINOLONE ACETONIDE 40 MG/ML
40 INJECTION, SUSPENSION INTRA-ARTICULAR; INTRAMUSCULAR ONCE
Status: COMPLETED | OUTPATIENT
Start: 2022-10-21 | End: 2022-10-21

## 2022-10-21 RX ADMIN — TRIAMCINOLONE ACETONIDE 40 MG: 40 INJECTION, SUSPENSION INTRA-ARTICULAR; INTRAMUSCULAR at 13:38

## 2022-10-21 NOTE — PROGRESS NOTES
Patient: Hira Piña                MRN: [id number]       SSN: [id number]  YOB: 1955        AGE: 75 y.o. SEX: female  Body mass index is 29.8 kg/m². PCP: Kevon Sánchez MD  10/21/22  Occupation: Primary care physician    ASSESSMENT & PLAN  Diagnosis: 75 y. o.female with right knee osteoarthritis primarily affecting the patellofemoral joint. She has been active with walking and has tried Tylenol and ibuprofen which helps with her pain but only last for couple hours. We discussed further conservative measures including rest, ice, elevation, physical therapy, prescription NSAIDs, activity modification and corticosteroid injection. We will for the corticosteroid injection to the right knee today. She tolerated it well. I can see her on as-needed basis going forward. Plan:  Rest, ice, compression, elevation, OTC NSAIDs, Tylenol, and Corticosteroid injection  1. Primary localized osteoarthritis of right knee  - AMB POC XRAY, KNEE; COMPLETE, 4+ VIEW  - DRAIN/INJECT LARGE JOINT/BURSA  - triamcinolone acetonide (KENALOG-40) 40 mg/mL injection 40 mg        IMAGING:  Imaging read by myself and interpreted as follows:  4 View Xray of the right knee including AP, Lateral, Sunrise and Tunnel views show approximate 25% medial joint space narrowing as well as subchondral sclerosis and subchondral cyst formation of the patellofemoral join. There is tricompartmental osteophytosis    CHIEF COMPLAINT:   Chief Complaint   Patient presents with    Knee Pain     Right knee pain       HPI: Hira Piña is a 75 y.o. female patient who is here as a new patient for evaluation of right knee pain. It has been a problem for a few months. She has had mild and intermittent pain in that knee for several years but over the last couple months has become more significant. She recently began wearing orthotics and her knee pain advance slightly soon after this.   Symptoms: Constant dull aching right knee pain even at rest at about a 2 out of 10. With walking it can become sharp and she points directly to the medial aspect of the patella. Disrupted activities: Walking and Exercise  Previous therapies: NSAIDs: Ibuprofen and Tylenol  Tobacco Use: Low Risk     Smoking Tobacco Use: Never    Smokeless Tobacco Use: Never     Key Anti-Platelet Anticoagulant Meds       The patient is on no antiplatelet meds or anticoagulants. Past Medical History:   Diagnosis Date    Anemia NEC     as a young child    Arthritis     GERD (gastroesophageal reflux disease)     Hypercholesterolemia     Hypothyroidism due to acquired atrophy of thyroid 4/22/2015       Family History   Problem Relation Age of Onset    Lung Disease Mother     Hypertension Mother     Diabetes Mother     Dementia Mother     Heart Disease Father        Current Outpatient Medications   Medication Sig Dispense Refill    estradioL (VAGIFEM) 10 mcg tab vaginal tablet INSERT 1 TABLET INTO VAGINA EVERY TUESDAY AND FRIDAY --DISCARD PREVIOUS ORDER-- 24 Tablet 3    pantoprazole (PROTONIX) 40 mg tablet TAKE 1 TABLET BY MOUTH ONE TIME A DAY 90 Tablet 2    rosuvastatin (CRESTOR) 20 mg tablet TAKE ONE TABLET BY MOUTH NIGHTLY 90 Tablet 3    therapeutic multivitamin-minerals (THERAGRAN-M) tablet Take 1 Tablet by mouth daily. coenzyme Q-10 (CO Q-10) 30 mg capsule Take 200 mg by mouth.      vitamin E (AQUA GEMS) 400 unit capsule Take 400 Units by mouth daily. alendronate (FOSAMAX) 70 mg tablet Take 1 Tablet by mouth every seven (7) days. 12 Tablet 3    hydrOXYchloroQUINE (PLAQUENIL) 200 mg tablet Take 400 mg by mouth daily. cholecalciferol, vitamin D3, 50 mcg (2,000 unit) tab Take 5,000 Units by mouth.      loratadine (CLARITIN) 10 mg tablet Take 10 mg by mouth daily. acetaminophen (TYLENOL) 325 mg tablet Take  by mouth every four (4) hours as needed.        Current Facility-Administered Medications   Medication Dose Route Frequency Provider Last Rate Last Admin    triamcinolone acetonide (KENALOG-40) 40 mg/mL injection 40 mg  40 mg Intra artICUlar ONCE Jose Matthews Crew, DO           Allergies   Allergen Reactions    Azulfidine [Sulfasalazine] Rash    Arava [Leflunomide] Other (comments)     Elevated liver enzymes    Iodinated Contrast Media Other (comments)     Severe overall skin reaction    Prilosec [Omeprazole Magnesium] Nausea Only       Past Surgical History:   Procedure Laterality Date    HAND/FINGER SURGERY UNLISTED  2017    HAND/FINGER SURGERY UNLISTED      hardware removal     HAND/FINGER SURGERY UNLISTED      osteotomy    HAND/FINGER SURGERY UNLISTED      orif    HAND/FINGER SURGERY UNLISTED      trigger release     HX GYN      D&C    HX TONSILLECTOMY      HX WRIST FRACTURE TX  09/01/2017       Social History     Socioeconomic History    Marital status: SINGLE     Spouse name: Not on file    Number of children: Not on file    Years of education: Not on file    Highest education level: Not on file   Occupational History    Not on file   Tobacco Use    Smoking status: Never    Smokeless tobacco: Never   Vaping Use    Vaping Use: Never used   Substance and Sexual Activity    Alcohol use: No    Drug use: No    Sexual activity: Not on file   Other Topics Concern    Not on file   Social History Narrative    Not on file     Social Determinants of Health     Financial Resource Strain: Not on file   Food Insecurity: Not on file   Transportation Needs: Not on file   Physical Activity: Insufficiently Active    Days of Exercise per Week: 1 day    Minutes of Exercise per Session: 90 min   Stress: Not on file   Social Connections: Not on file   Intimate Partner Violence: Unknown    Fear of Current or Ex-Partner: Patient refused    Emotionally Abused: Patient refused    Physically Abused: Patient refused    Sexually Abused: Patient refused   Housing Stability: Not on file       REVIEW OF SYSTEMS:    14 point review of systems on the intake form is negative except as noted in the HPI    PHYSICAL EXAMINATION:  Vitals:    10/21/22 1302   Resp: 16   Weight: 173 lb 9.6 oz (78.7 kg)   Height: 5' 4\" (1.626 m)   PainSc:   2   PainLoc: Knee     Body mass index is 29.8 kg/m². GENERAL: Alert and oriented x3, in no acute distress, well-developed, well-nourished. HEENT: Normocephalic, atraumatic. MSK:  Right Knee Exam     Tenderness   The patient is experiencing tenderness in the patella. Range of Motion   Extension:  0   Flexion:  130     Tests   Varus: negative Valgus: negative  Drawer:  Anterior - negative    Posterior - negative  Patellar apprehension: negative    Other   Erythema: absent  Scars: absent  Sensation: normal  Pulse: present  Swelling: mild       JAYESH PROCEDURE OUTPATIENT PROCEDURE    PROCEDURE:  Injection of the right Intra-articular knee: Inferolateral       Yelena SOSA DO, have reviewed the History, Physical and updated the Allergic reactions for [facility] performed immediately prior to start of procedure:       * Patient was identified by name Kike Andino and date of birth (1955)  * Agreement on procedure being performed was verified  * Risks and Benefits explained to the patient: see below  * Procedure site verified and marked as necessary  * Patient was positioned for comfort  * Verbal Consent Verified by myself and my office staff. *  All patient and family questions answered     The procedure was explained to the patient and possible adverse reactions were discussed. These risks included, but not limited to: bleeding, infection, septic arthritis, local skin irritation (skin discoloration, hyperpigmentation, hypopigmentation, thinning of the skin, development of a wound, skin necrosis), synovitis, subcutaneous fat pad atrophy, subcutaneous abscess, tendon rupture. Infection may occur requiring surgical debridement and hospitalization.     Time: 2:34 PM  Date of procedure: 10/21/2022  Procedure performed by: Nanette Garza DO  Provider assisted by:  in office MA  How tolerated by patient: tolerated the procedure well with no complications  Comments: none    The right Intra-articular knee: Inferolateral area was prepped and cleansed with: sterile fashion using a following solution:  Alcohol. The injection was administered:  A solution of 80mg of  Kenalog and 2 ml of 2% plain lidocaine% plain was used. Post injection instructions were given to [facility]: Remove the band aid in 3 hours, Wash site with clean soap, No further dressings there after. Call Nanette Garza  [phone] if any: pain, redness, drainage, fever, or any concerns/questions that [facility] may have regarding the injection. [facility] was advised on the signs of infection and instructed to go to the ER if it is office after hours. [facility] tolerated the injection quite well. Nanette Garza DO MD  2:34 PM              Prescription medication management discussed. Thank you for the opportunity to treat [facility]     Electronically signed by: Nanette Garza DO    Note: This note was completed using voice recognition software.   Any typographical/name errors or mistakes are unintentional.

## 2022-10-26 ENCOUNTER — CLINICAL SUPPORT (OUTPATIENT)
Dept: INTERNAL MEDICINE CLINIC | Age: 67
End: 2022-10-26
Payer: COMMERCIAL

## 2022-10-26 DIAGNOSIS — Z23 NEEDS FLU SHOT: Primary | ICD-10-CM

## 2022-10-26 PROCEDURE — 90471 IMMUNIZATION ADMIN: CPT | Performed by: INTERNAL MEDICINE

## 2022-10-26 PROCEDURE — 90694 VACC AIIV4 NO PRSRV 0.5ML IM: CPT | Performed by: INTERNAL MEDICINE

## 2022-10-26 NOTE — PROGRESS NOTES
Helen Stover is a 75 y.o. female who presents for routine immunizations. She denies any symptoms , reactions or allergies that would exclude them from being immunized today. Risks and adverse reactions were discussed and the VIS was given to them. All questions were addressed. She was observed for 10 min post injection. There were no reactions observed.     Av Marrero LPN

## 2022-10-26 NOTE — PATIENT INSTRUCTIONS
Vaccine Information Statement    Influenza (Flu) Vaccine (Inactivated or Recombinant): What You Need to Know    Many vaccine information statements are available in Romanian and other languages. See www.immunize.org/vis. Hojas de información sobre vacunas están disponibles en español y en muchos otros idiomas. Visite www.immunize.org/vis. 1. Why get vaccinated? Influenza vaccine can prevent influenza (flu). Flu is a contagious disease that spreads around the [facility] every year, usually between October and May. Anyone can get the flu, but it is more dangerous for some people. Infants and young children, people 72 years and older, pregnant people, and people with certain health conditions or a weakened immune system are at greatest risk of flu complications. Pneumonia, bronchitis, sinus infections, and ear infections are examples of flu-related complications. If you have a medical condition, such as heart disease, cancer, or diabetes, flu can make it worse. Flu can cause fever and chills, sore throat, muscle aches, fatigue, cough, headache, and runny or stuffy nose. Some people may have vomiting and diarrhea, though this is more common in children than adults. In an average year, thousands of people in the [facility] die from flu, and many more are hospitalized. Flu vaccine prevents millions of illnesses and flu-related visits to the doctor each year. 2. Influenza vaccines     CDC recommends everyone 6 months and older get vaccinated every flu season. Children 6 months through 6years of age may need 2 doses during a single flu season. Everyone else needs only 1 dose each flu season. It takes about 2 weeks for protection to develop after vaccination. There are many flu viruses, and they are always changing. Each year a new flu vaccine is made to protect against the influenza viruses believed to be likely to cause disease in the upcoming flu season.  Even when the vaccine doesnt exactly match these viruses, it may still provide some protection. Influenza vaccine does not cause flu. Influenza vaccine may be given at the same time as other vaccines. 3. Talk with your health care provider    Tell your vaccination provider if the person getting the vaccine:  Has had an allergic reaction after a previous dose of influenza vaccine, or has any severe, life-threatening allergies   Has ever had Guillain-Barré Syndrome (also called GBS)    In some cases, your health care provider may decide to postpone influenza vaccination until a future visit. Influenza vaccine can be administered at any time during pregnancy. People who are or will be pregnant during influenza season should receive inactivated influenza vaccine. People with minor illnesses, such as a cold, may be vaccinated. People who are moderately or severely ill should usually wait until they recover before getting influenza vaccine. Your health care provider can give you more information. 4. Risks of a vaccine reaction    Soreness, redness, and swelling where the shot is given, fever, muscle aches, and headache can happen after influenza vaccination. There may be a very small increased risk of Guillain-Barré Syndrome (GBS) after inactivated influenza vaccine (the flu shot). Yoel Harvey children who get the flu shot along with pneumococcal vaccine (PCV13) and/or DTaP vaccine at the same time might be slightly more likely to have a seizure caused by fever. Tell your health care provider if a child who is getting flu vaccine has ever had a seizure. People sometimes faint after medical procedures, including vaccination. Tell your provider if you feel dizzy or have vision changes or ringing in the ears. As with any medicine, there is a very remote chance of a vaccine causing a severe allergic reaction, other serious injury, or death. 5. What if there is a serious problem?     An allergic reaction could occur after the vaccinated person leaves the clinic. If you see signs of a severe allergic reaction (hives, swelling of the face and throat, difficulty breathing, a fast heartbeat, dizziness, or weakness), call 9-1-1 and get the person to the nearest hospital.    For other signs that concern you, call your health care provider. Adverse reactions should be reported to the Vaccine Adverse Event Reporting System (VAERS). Your health care provider will usually file this report, or you can do it yourself. Visit the VAERS website at www.vaers. [facility].gov or call 7-599.388.6719. VAERS is only for reporting reactions, and VAERS staff members do not give medical advice. 6. The National Vaccine Injury Compensation Program    The [facility] Vaccine Injury Compensation Program (VICP) is a federal program that was created to compensate people who may have been injured by certain vaccines. Claims regarding alleged injury or death due to vaccination have a time limit for filing, which may be as short as two years. Visit the VICP website at www.[facility].gov/vaccinecompensation or call 5-373.989.1364 to learn about the program and about filing a claim. 7. How can I learn more? Ask your health care provider. Call your local or state health department. Visit the website of the Food and Drug Administration (FDA) for vaccine package inserts and additional information at www.fda.gov/vaccines-blood-biologics/vaccines. Contact the Centers for Disease Control and Prevention (CDC): Call 0-990.838.6281 (1-800-CDC-INFO) or  Visit CDCs influenza website at www.cdc.gov/flu. Vaccine Information Statement   Inactivated Influenza Vaccine   8/6/2021  42 MABLE Cali 749XM-31   Department of Health and Human Services  Centers for Disease Control and Prevention    Office Use Only

## 2022-11-16 ENCOUNTER — OFFICE VISIT (OUTPATIENT)
Dept: FAMILY MEDICINE CLINIC | Age: 67
End: 2022-11-16
Payer: COMMERCIAL

## 2022-11-16 VITALS
HEART RATE: 76 BPM | HEIGHT: 64 IN | RESPIRATION RATE: 16 BRPM | SYSTOLIC BLOOD PRESSURE: 112 MMHG | WEIGHT: 172 LBS | DIASTOLIC BLOOD PRESSURE: 75 MMHG | TEMPERATURE: 98.3 F | OXYGEN SATURATION: 99 % | BODY MASS INDEX: 29.37 KG/M2

## 2022-11-16 DIAGNOSIS — E78.5 HYPERLIPIDEMIA, UNSPECIFIED HYPERLIPIDEMIA TYPE: ICD-10-CM

## 2022-11-16 DIAGNOSIS — Z00.00 WELL WOMAN EXAM (NO GYNECOLOGICAL EXAM): Primary | ICD-10-CM

## 2022-11-16 DIAGNOSIS — M85.80 OSTEOPENIA, UNSPECIFIED LOCATION: ICD-10-CM

## 2022-11-16 DIAGNOSIS — K21.9 GASTROESOPHAGEAL REFLUX DISEASE, UNSPECIFIED WHETHER ESOPHAGITIS PRESENT: ICD-10-CM

## 2022-11-16 DIAGNOSIS — E55.9 VITAMIN D DEFICIENCY: ICD-10-CM

## 2022-11-16 DIAGNOSIS — M19.90 INFLAMMATORY OSTEOARTHRITIS: ICD-10-CM

## 2022-11-16 DIAGNOSIS — R73.03 PREDIABETES: ICD-10-CM

## 2022-11-16 DIAGNOSIS — E03.4 HYPOTHYROIDISM DUE TO ACQUIRED ATROPHY OF THYROID: ICD-10-CM

## 2022-11-16 PROCEDURE — 99397 PER PM REEVAL EST PAT 65+ YR: CPT | Performed by: INTERNAL MEDICINE

## 2022-11-16 RX ORDER — CALCIUM CARBONATE/VITAMIN D3 600 MG-125
600 TABLET ORAL DAILY
COMMUNITY

## 2022-11-16 NOTE — PROGRESS NOTES
1. \"Have you been to the ER, urgent care clinic since your last visit? Hospitalized since your last visit? \" No    2. \"Have you seen or consulted any other health care providers outside of the [address] [place] [address] since your last visit? \" Yes Reason for visit: visit with Optometrist, saw ortho Dr. Star Matos  and follow up with Dr. Heather Jorgensen. 3. For patients aged 39-70: Has the patient had a colonoscopy / FIT/ Cologuard? Yes - no Care Gap present      If the patient is female:    4. For patients aged 41-77: Has the patient had a mammogram within the past 2 years? Yes - no Care Gap present      5. For patients aged 21-65: Has the patient had a pap smear?  NA - based on age or sex

## 2022-11-16 NOTE — PATIENT INSTRUCTIONS
Well Visit, Women 48 to 72: Care Instructions  Overview     Well visits can help you stay healthy. Your doctor has checked your overall health and may have suggested ways to take good care of yourself. Your doctor also may have recommended tests. At home, you can help prevent illness with healthy eating, regular exercise, and other steps. Follow-up care is a key part of your treatment and safety. Be sure to make and go to all appointments, and call your doctor if you are having problems. It's also a good idea to know your test results and keep a list of the medicines you take. How can you care for yourself at home? Get screening tests that you and your doctor decide on. Screening helps find diseases before any symptoms appear. Eat healthy foods. Choose fruits, vegetables, whole grains, protein, and low-fat dairy foods. Limit fat, especially saturated fat. Reduce salt in your diet. Limit alcohol. Have no more than 1 drink a day or 7 drinks a week. Get at least 30 minutes of exercise on most days of the week. Walking is a good choice. You also may want to do other activities, such as running, swimming, cycling, or playing tennis or team sports. Reach and stay at a healthy weight. This will lower your risk for many problems, such as obesity, diabetes, heart disease, and high blood pressure. Do not smoke. Smoking can make health problems worse. If you need help quitting, talk to your doctor about stop-smoking programs and medicines. These can increase your chances of quitting for good. Care for your mental health. It is easy to get weighed down by worry and stress. Learn strategies to manage stress, like deep breathing and mindfulness, and stay connected with your family and community. If you find you often feel sad or hopeless, talk with your doctor. Treatment can help. Talk to your doctor about whether you have any risk factors for sexually transmitted infections (STIs).  You can help prevent STIs if you wait to have sex with a new partner (or partners) until you've each been tested for STIs. It also helps if you use condoms (male or female condoms) and if you limit your sex partners to one person who only has sex with you. Vaccines are available for some STIs. If you think you may have a problem with alcohol or drug use, talk to your doctor. This includes prescription medicines (such as amphetamines and opioids) and illegal drugs (such as cocaine and methamphetamine). Your doctor can help you figure out what type of treatment is best for you. Protect your skin from too much sun. When you're outdoors from 10 a.m. to 4 p.m., stay in the shade or cover up with clothing and a hat with a wide brim. Wear sunglasses that block UV rays. Even when it's cloudy, put broad-spectrum sunscreen (SPF 30 or higher) on any exposed skin. See a dentist one or two times a year for checkups and to have your teeth cleaned. Wear a seat belt in the car. When should you call for help? Watch closely for changes in your health, and be sure to contact your doctor if you have any problems or symptoms that concern you. Where can you learn more? Go to http://www.gray.com/  Enter [id number] in the search box to learn more about \"Well Visit, Women 50 to 72: Care Instructions. \"  Current as of: June 6, 2022               Content Version: 13.4  © 0199-9730 Healthwise, Incorporated. Care instructions adapted under license by [employer] (which disclaims liability or warranty for this information). If you have questions about a medical condition or this instruction, always ask your healthcare professional. [place] [address] any warranty or liability for your use of this information.

## 2022-11-16 NOTE — PROGRESS NOTES
Assessment/ Plan:   Diagnoses and all orders for this visit:    1. Well woman exam (no gynecological exam)-Advised re: monthly self breast exam, dental prophylaxis Q 6 months, regular exercise, yearly eye exam, daily intake of Ca+D   -     CBC WITH AUTOMATED DIFF; Future  -     METABOLIC PANEL, COMPREHENSIVE; Future  -     LIPID PANEL; Future  -     HEMOGLOBIN A1C WITH EAG; Future  -     TSH 3RD GENERATION; Future  -     VITAMIN D, 25 HYDROXY; Future    2. Hyperlipidemia, unspecified hyperlipidemia type-goal LDL of less than 100 on Crestor    3. Osteopenia, unspecified location with ?fragility fractures-on Fosamax, will send for follow up Dexa  -     [facility]; Future    4. Inflammatory osteoarthritis of both hands-Dr Antoine, Rheum following; on Plaquenil    5. Hypothyroidism due to acquired atrophy of thyroid-will continue to monitor TSH as TPO antibody was negative  -     TSH 3RD GENERATION; Future    6. Prediabetes-continue to watch diet/exercise  -     HEMOGLOBIN A1C WITH EAG; Future    7. Vitamin D deficiency-on OTC Vit D  -     VITAMIN D, 25 HYDROXY; Future    8. Gastroesophageal reflux disease, unspecified whether esophagitis present-symptoms still present/a bit worse despite daily PPI; she will make an appt with GI for possible EGD/? Recurrence of H pylori since she has travelled outside of the [place] several times since her last EGD    9. On Vagifem for atrophic vaginitis    Follow-up and Dispositions    Return in about 6 months (around 5/16/2023). Chief Complaint   Patient presents with    Follow Up Chronic Condition     6 month    Complete Physical       Pt is a 75y.o. year old female who presents for follow up of her chronic medical problems/annual wellness visit    There are no preventive care reminders to display for this patient.        Wt Readings from Last 3 Encounters:   11/16/22 172 lb (78 kg)   10/21/22 173 lb 9.6 oz (78.7 kg)   08/10/22 174 lb (78.9 kg) BMI 29    BP Readings from Last 3 Encounters:   11/16/22 112/75   05/18/22 117/72   10/27/21 124/72        Sees Dr Parekh Found    Due for bone density  Last was 11/2020-    Reflux getting bad despite daily PPI Protonix  Had EGD a while back  Dr Carlos Bowser problems better      ROS:    Pt denies: Wt loss, Fever/Chills, HA, Visual changes, Fatigue, Chest pain, SOB, ROBERTS, Abd pain, N/V/D/C, Blood in stool or urine, Edema. Pertinent positive as above in HPI. All others were negative    Patient Active Problem List   Diagnosis Code    Hyperlipidemia E78.5    Vitamin D deficiency E55.9    Inflammatory osteoarthritis M19.90    Colon polyp K63.5    Family history of osteoporosis Z82.62    Hypothyroidism due to acquired atrophy of thyroid E03.4    Osteopenia M85.80    Advance directive discussed with patient Z71.89    Gastroesophageal reflux disease K21.9    Overweight (BMI 25.0-29. 9) E66.3    Atrophic vaginitis N95.2       Past Medical History:   Diagnosis Date    Anemia NEC     as a young child    Arthritis     GERD (gastroesophageal reflux disease)     Hypercholesterolemia     Hypothyroidism due to acquired atrophy of thyroid 4/22/2015       Current Outpatient Medications   Medication Sig Dispense Refill    calcium-cholecalciferol, d3, (CALCIUM 600 + D) 600-125 mg-unit tab Take 600 mg by mouth daily. estradioL (VAGIFEM) 10 mcg tab vaginal tablet INSERT 1 TABLET INTO VAGINA EVERY TUESDAY AND FRIDAY --DISCARD PREVIOUS ORDER-- 24 Tablet 3    pantoprazole (PROTONIX) 40 mg tablet TAKE 1 TABLET BY MOUTH ONE TIME A DAY 90 Tablet 2    rosuvastatin (CRESTOR) 20 mg tablet TAKE ONE TABLET BY MOUTH NIGHTLY 90 Tablet 3    therapeutic multivitamin-minerals (THERAGRAN-M) tablet Take 1 Tablet by mouth daily. coenzyme Q-10 (CO Q-10) 30 mg capsule Take 200 mg by mouth.      vitamin E (AQUA GEMS) 400 unit capsule Take 400 Units by mouth daily. alendronate (FOSAMAX) 70 mg tablet Take 1 Tablet by mouth every seven (7) days.  12 Tablet 3    hydrOXYchloroQUINE (PLAQUENIL) 200 mg tablet Take 400 mg by mouth daily. cholecalciferol, vitamin D3, 50 mcg (2,000 unit) tab Take 5,000 Units by mouth.      loratadine (CLARITIN) 10 mg tablet Take 10 mg by mouth daily. acetaminophen (TYLENOL) 325 mg tablet Take  by mouth every four (4) hours as needed. Social History     Tobacco Use   Smoking Status Never   Smokeless Tobacco Never       Allergies   Allergen Reactions    Azulfidine [Sulfasalazine] Rash    Arava [Leflunomide] Other (comments)     Elevated liver enzymes    Iodinated Contrast Media Other (comments)     Severe overall skin reaction    Prilosec [Omeprazole Magnesium] Nausea Only       Patient Labs were reviewed: yes    Patient Past Records were reviewed: yes      Objective:     Vitals:    11/16/22 0824   BP: 112/75   Pulse: 76   Resp: 16   Temp: 98.3 °F (36.8 °C)   TempSrc: Temporal   SpO2: 99%   Weight: 172 lb (78 kg)   Height: 5' 4\" (1.626 m)     Body mass index is 29.52 kg/m². Exam:   Appearance: alert, well appearing,  oriented to person, place, and time, acyanotic, in no respiratory distress and well hydrated. HEENT:  NC/AT, pink conj, anicteric sclerae  Neck:  No cervical lymphadenopathy, no JVD, no thyromegaly, no carotid bruit  Heart:  RRR without M/R/G  Lungs:  CTAB, no rhonchi, rales, or wheezes with good air exchange   Abdomen:  Non-tender, pos bowel sounds, no hepatosplenomegaly  Ext:  No C/C/E    Skin: no rash  Neuro: no lateralizing signs, CNs II-XII intact            I have discussed the diagnosis with the patient and the intended plan as seen in the above orders. The patient has received an After-Visit Summary and questions were answered concerning future plans. Medication Side Effects and Warnings were discussed with patient: yes    Patient verbalized understanding of above instructions.     Trell Escobedo MD  Internal Medicine  [facility]

## 2022-11-17 LAB
25(OH)D3+25(OH)D2 SERPL-MCNC: 56 NG/ML (ref 30–100)
ALBUMIN SERPL-MCNC: 4.3 G/DL (ref 3.8–4.8)
ALBUMIN/GLOB SERPL: 2.4 {RATIO} (ref 1.2–2.2)
ALP SERPL-CCNC: 64 IU/L (ref 44–121)
ALT SERPL-CCNC: 29 IU/L (ref 0–32)
AST SERPL-CCNC: 32 IU/L (ref 0–40)
BASOPHILS # BLD AUTO: 0 X10E3/UL (ref 0–0.2)
BASOPHILS NFR BLD AUTO: 1 %
BILIRUB SERPL-MCNC: <0.2 MG/DL (ref 0–1.2)
BUN SERPL-MCNC: 19 MG/DL (ref 8–27)
BUN/CREAT SERPL: 20 (ref 12–28)
CALCIUM SERPL-MCNC: 9.3 MG/DL (ref 8.7–10.3)
CHLORIDE SERPL-SCNC: 105 MMOL/L (ref 96–106)
CHOLEST SERPL-MCNC: 159 MG/DL (ref 100–199)
CO2 SERPL-SCNC: 24 MMOL/L (ref 20–29)
CREAT SERPL-MCNC: 0.93 MG/DL (ref 0.57–1)
EGFR: 67 ML/MIN/1.73
EOSINOPHIL # BLD AUTO: 0.2 X10E3/UL (ref 0–0.4)
EOSINOPHIL NFR BLD AUTO: 3 %
ERYTHROCYTE [DISTWIDTH] IN BLOOD BY AUTOMATED COUNT: 15 % (ref 11.7–15.4)
EST. AVERAGE GLUCOSE BLD GHB EST-MCNC: 128 MG/DL
GLOBULIN SER CALC-MCNC: 1.8 G/DL (ref 1.5–4.5)
GLUCOSE SERPL-MCNC: 97 MG/DL (ref 70–99)
HBA1C MFR BLD: 6.1 % (ref 4.8–5.6)
HCT VFR BLD AUTO: 36.8 % (ref 34–46.6)
HDLC SERPL-MCNC: 77 MG/DL
HGB BLD-MCNC: 12 G/DL (ref 11.1–15.9)
IMM GRANULOCYTES # BLD AUTO: 0 X10E3/UL (ref 0–0.1)
IMM GRANULOCYTES NFR BLD AUTO: 0 %
IMP & REVIEW OF LAB RESULTS: NORMAL
LDLC SERPL CALC-MCNC: 68 MG/DL (ref 0–99)
LYMPHOCYTES # BLD AUTO: 2.3 X10E3/UL (ref 0.7–3.1)
LYMPHOCYTES NFR BLD AUTO: 39 %
MCH RBC QN AUTO: 27.3 PG (ref 26.6–33)
MCHC RBC AUTO-ENTMCNC: 32.6 G/DL (ref 31.5–35.7)
MCV RBC AUTO: 84 FL (ref 79–97)
MONOCYTES # BLD AUTO: 0.5 X10E3/UL (ref 0.1–0.9)
MONOCYTES NFR BLD AUTO: 9 %
NEUTROPHILS # BLD AUTO: 2.9 X10E3/UL (ref 1.4–7)
NEUTROPHILS NFR BLD AUTO: 48 %
PLATELET # BLD AUTO: 206 X10E3/UL (ref 150–450)
POTASSIUM SERPL-SCNC: 4.2 MMOL/L (ref 3.5–5.2)
PROT SERPL-MCNC: 6.1 G/DL (ref 6–8.5)
RBC # BLD AUTO: 4.39 X10E6/UL (ref 3.77–5.28)
SODIUM SERPL-SCNC: 144 MMOL/L (ref 134–144)
TRIGL SERPL-MCNC: 71 MG/DL (ref 0–149)
TSH SERPL DL<=0.005 MIU/L-ACNC: 4.03 UIU/ML (ref 0.45–4.5)
VLDLC SERPL CALC-MCNC: 14 MG/DL (ref 5–40)
WBC # BLD AUTO: 5.8 X10E3/UL (ref 3.4–10.8)

## 2022-12-07 ENCOUNTER — HOSPITAL ENCOUNTER (OUTPATIENT)
Dept: BONE DENSITY | Age: 67
Discharge: HOME | End: 2022-12-07
Attending: INTERNAL MEDICINE
Payer: MEDICARE

## 2022-12-07 DIAGNOSIS — M85.80 OSTEOPENIA, UNSPECIFIED LOCATION: ICD-10-CM

## 2022-12-07 PROCEDURE — 77080 DXA BONE DENSITY AXIAL: CPT

## 2023-01-23 DIAGNOSIS — K21.9 GASTROESOPHAGEAL REFLUX DISEASE, UNSPECIFIED WHETHER ESOPHAGITIS PRESENT: ICD-10-CM

## 2023-01-23 DIAGNOSIS — E78.5 HYPERLIPIDEMIA, UNSPECIFIED HYPERLIPIDEMIA TYPE: ICD-10-CM

## 2023-01-24 RX ORDER — ROSUVASTATIN CALCIUM 20 MG/1
TABLET, COATED ORAL
Qty: 90 TABLET | Refills: 3 | Status: SHIPPED | OUTPATIENT
Start: 2023-01-24

## 2023-01-24 RX ORDER — PANTOPRAZOLE SODIUM 40 MG/1
TABLET, DELAYED RELEASE ORAL
Qty: 90 TABLET | Refills: 2 | Status: SHIPPED | OUTPATIENT
Start: 2023-01-24

## 2023-02-21 RX ORDER — ALENDRONATE SODIUM 70 MG/1
TABLET ORAL
Qty: 12 TABLET | Refills: 3 | Status: SHIPPED | OUTPATIENT
Start: 2023-02-21

## 2023-02-22 RX ORDER — ATOVAQUONE AND PROGUANIL HYDROCHLORIDE 250; 100 MG/1; MG/1
1 TABLET, FILM COATED ORAL DAILY
Qty: 21 TABLET | Refills: 0 | Status: SHIPPED | OUTPATIENT
Start: 2023-02-22

## 2023-02-22 RX ORDER — AZITHROMYCIN 250 MG/1
250 TABLET, FILM COATED ORAL SEE ADMIN INSTRUCTIONS
Qty: 6 TABLET | Refills: 0 | Status: SHIPPED | OUTPATIENT
Start: 2023-02-22 | End: 2023-02-27

## 2023-03-02 DIAGNOSIS — D64.9 ANEMIA, UNSPECIFIED TYPE: Primary | ICD-10-CM

## 2023-03-03 ENCOUNTER — HOSPITAL ENCOUNTER (OUTPATIENT)
Facility: HOSPITAL | Age: 68
Setting detail: SPECIMEN
End: 2023-03-03

## 2023-03-03 DIAGNOSIS — D64.9 ANEMIA, UNSPECIFIED TYPE: ICD-10-CM

## 2023-03-03 LAB
ERYTHROCYTE [DISTWIDTH] IN BLOOD BY AUTOMATED COUNT: 15.9 % (ref 11.6–14.5)
HCT VFR BLD AUTO: 39.7 % (ref 35–45)
HGB BLD-MCNC: 12 G/DL (ref 12–16)
MCH RBC QN AUTO: 26.1 PG (ref 24–34)
MCHC RBC AUTO-ENTMCNC: 30.2 G/DL (ref 31–37)
MCV RBC AUTO: 86.3 FL (ref 78–100)
NRBC # BLD: 0 K/UL (ref 0–0.01)
NRBC BLD-RTO: 0 PER 100 WBC
PLATELET # BLD AUTO: 196 K/UL (ref 135–420)
PMV BLD AUTO: 12.1 FL (ref 9.2–11.8)
RBC # BLD AUTO: 4.6 M/UL (ref 4.2–5.3)
WBC # BLD AUTO: 6.3 K/UL (ref 4.6–13.2)

## 2023-03-03 PROCEDURE — 36415 COLL VENOUS BLD VENIPUNCTURE: CPT

## 2023-03-03 PROCEDURE — 85027 COMPLETE CBC AUTOMATED: CPT

## 2023-05-17 ENCOUNTER — OFFICE VISIT (OUTPATIENT)
Facility: CLINIC | Age: 68
End: 2023-05-17
Payer: COMMERCIAL

## 2023-05-17 VITALS
SYSTOLIC BLOOD PRESSURE: 115 MMHG | BODY MASS INDEX: 29.19 KG/M2 | TEMPERATURE: 98 F | DIASTOLIC BLOOD PRESSURE: 67 MMHG | HEART RATE: 75 BPM | HEIGHT: 64 IN | WEIGHT: 171 LBS | OXYGEN SATURATION: 98 % | RESPIRATION RATE: 14 BRPM

## 2023-05-17 DIAGNOSIS — M19.90 INFLAMMATORY OSTEOARTHRITIS: ICD-10-CM

## 2023-05-17 DIAGNOSIS — E03.4 HYPOTHYROIDISM DUE TO ACQUIRED ATROPHY OF THYROID: Primary | ICD-10-CM

## 2023-05-17 DIAGNOSIS — N95.2 ATROPHIC VAGINITIS: ICD-10-CM

## 2023-05-17 DIAGNOSIS — M85.80 OSTEOPENIA, UNSPECIFIED LOCATION: ICD-10-CM

## 2023-05-17 DIAGNOSIS — E55.9 VITAMIN D DEFICIENCY: ICD-10-CM

## 2023-05-17 DIAGNOSIS — K21.9 GASTROESOPHAGEAL REFLUX DISEASE WITHOUT ESOPHAGITIS: ICD-10-CM

## 2023-05-17 DIAGNOSIS — R73.03 PREDIABETES: ICD-10-CM

## 2023-05-17 DIAGNOSIS — E78.5 HYPERLIPIDEMIA, UNSPECIFIED HYPERLIPIDEMIA TYPE: ICD-10-CM

## 2023-05-17 PROCEDURE — 99214 OFFICE O/P EST MOD 30 MIN: CPT | Performed by: INTERNAL MEDICINE

## 2023-05-17 PROCEDURE — 1123F ACP DISCUSS/DSCN MKR DOCD: CPT | Performed by: INTERNAL MEDICINE

## 2023-05-17 SDOH — ECONOMIC STABILITY: FOOD INSECURITY: WITHIN THE PAST 12 MONTHS, THE FOOD YOU BOUGHT JUST DIDN'T LAST AND YOU DIDN'T HAVE MONEY TO GET MORE.: PATIENT DECLINED

## 2023-05-17 SDOH — ECONOMIC STABILITY: HOUSING INSECURITY
IN THE LAST 12 MONTHS, WAS THERE A TIME WHEN YOU DID NOT HAVE A STEADY PLACE TO SLEEP OR SLEPT IN A SHELTER (INCLUDING NOW)?: PATIENT REFUSED

## 2023-05-17 SDOH — ECONOMIC STABILITY: FOOD INSECURITY: WITHIN THE PAST 12 MONTHS, YOU WORRIED THAT YOUR FOOD WOULD RUN OUT BEFORE YOU GOT MONEY TO BUY MORE.: PATIENT DECLINED

## 2023-05-17 SDOH — ECONOMIC STABILITY: INCOME INSECURITY: HOW HARD IS IT FOR YOU TO PAY FOR THE VERY BASICS LIKE FOOD, HOUSING, MEDICAL CARE, AND HEATING?: PATIENT DECLINED

## 2023-05-17 ASSESSMENT — PATIENT HEALTH QUESTIONNAIRE - PHQ9
SUM OF ALL RESPONSES TO PHQ QUESTIONS 1-9: 0
SUM OF ALL RESPONSES TO PHQ9 QUESTIONS 1 & 2: 0
SUM OF ALL RESPONSES TO PHQ QUESTIONS 1-9: 0
1. LITTLE INTEREST OR PLEASURE IN DOING THINGS: 0
2. FEELING DOWN, DEPRESSED OR HOPELESS: 0

## 2023-05-17 NOTE — PROGRESS NOTES
Assessment/ Plan: Shirlene Carpenter was seen today for follow-up chronic condition. Diagnoses and all orders for this visit:    Hypothyroidism due to acquired atrophy of thyroid-will start Synthroid if TSH is above 10; anti TPO was prev neg  -     TSH; Future    Prediabetes-continue to watch diet  -     Hemoglobin A1C; Future    Osteopenia, unspecified location-on Fosamax, daily Ca+D; exercises regularly    Hyperlipidemia, unspecified hyperlipidemia type-goal LDL of less than 100 on Cresotr  -     Comprehensive Metabolic Panel; Future  -     Lipid Panel; Future    Vitamin D deficiency-on OTC   -     Vitamin D 25 Hydroxy; Future    Inflammatory osteoarthritis-on Plaquenil c/o rheum    Gastroesophageal reflux disease without esophagitis-on daily PPI  -     CBC; Future    Atrophic vaginitis-on vaginal estrogen          Follow-up and Dispositions    Return in about 6 months (around 11/17/2023) for follow up, physical.                 Chief Complaint   Patient presents with    Follow-up Chronic Condition     6 month       Pt is a 70y.o. year old female who presents for follow up of her chronic medical problems    There are no preventive care reminders to display for this patient. Wt Readings from Last 3 Encounters:   05/17/23 171 lb (77.6 kg)   11/16/22 172 lb (78 kg)   10/21/22 173 lb 9.6 oz (78.7 kg)    Feels bloated after stress fracture in tooth    BP Readings from Last 3 Encounters:   05/17/23 115/67   11/16/22 112/75   05/18/22 117/72      Fasting today    Problem with fingers getting worse  Humira, Areva, Mtx; currently on Plaquenil      Had axillary adenopathy with last bivalent vaccine    Covid in March-fatigue    Oct 2018 started Fosamax-  After 10 it loses its effectivity  Will not do Prolia          ROS:    Pt denies: Wt loss, Fever/Chills, HA, Visual changes, Fatigue, Chest pain, SOB, ROBERTSON, Abd pain, N/V/D/C, Blood in stool or urine, Edema. Pertinent positive as above in HPI.  All others were

## 2023-05-17 NOTE — PROGRESS NOTES
1. \"Have you been to the ER, urgent care clinic since your last visit? Hospitalized since your last visit? \" No    2. \"Have you seen or consulted any other health care providers outside of the [address] [place] [address] since your last visit? \" No    3. For patients aged 39-70: Has the patient had a colonoscopy / FIT/ Cologuard? Yes No Care Gaps Present      If the patient is female:    4. For patients aged 41-77: Has the patient had a mammogram within the past 2 years? Yes - no Care Gap present    5. For patients aged 21-65: Has the patient had a pap smear? NA - based on age or sex    There are no preventive care reminders to display for this patient.

## 2023-05-18 LAB
25(OH)D3+25(OH)D2 SERPL-MCNC: 47.2 NG/ML (ref 30–100)
ALBUMIN SERPL-MCNC: 4.3 G/DL (ref 3.8–4.8)
ALBUMIN/GLOB SERPL: 2.2 {RATIO} (ref 1.2–2.2)
ALP SERPL-CCNC: 66 IU/L (ref 44–121)
ALT SERPL-CCNC: 26 IU/L (ref 0–32)
AST SERPL-CCNC: 32 IU/L (ref 0–40)
BASOPHILS # BLD AUTO: 0.1 X10E3/UL (ref 0–0.2)
BASOPHILS NFR BLD AUTO: 1 %
BILIRUB SERPL-MCNC: 0.2 MG/DL (ref 0–1.2)
BUN SERPL-MCNC: 18 MG/DL (ref 8–27)
BUN/CREAT SERPL: 20 (ref 12–28)
CALCIUM SERPL-MCNC: 9.1 MG/DL (ref 8.7–10.3)
CHLORIDE SERPL-SCNC: 104 MMOL/L (ref 96–106)
CHOLEST SERPL-MCNC: 180 MG/DL (ref 100–199)
CO2 SERPL-SCNC: 20 MMOL/L (ref 20–29)
CREAT SERPL-MCNC: 0.88 MG/DL (ref 0.57–1)
EGFRCR SERPLBLD CKD-EPI 2021: 71 ML/MIN/1.73
EOSINOPHIL # BLD AUTO: 0.2 X10E3/UL (ref 0–0.4)
EOSINOPHIL NFR BLD AUTO: 3 %
ERYTHROCYTE [DISTWIDTH] IN BLOOD BY AUTOMATED COUNT: 17.1 % (ref 11.7–15.4)
GLOBULIN SER CALC-MCNC: 2 G/DL (ref 1.5–4.5)
GLUCOSE SERPL-MCNC: 101 MG/DL (ref 70–99)
HBA1C MFR BLD: 6 % (ref 4.8–5.6)
HCT VFR BLD AUTO: 40.6 % (ref 34–46.6)
HDLC SERPL-MCNC: 76 MG/DL
HGB BLD-MCNC: 13 G/DL (ref 11.1–15.9)
IMM GRANULOCYTES # BLD AUTO: 0 X10E3/UL (ref 0–0.1)
IMM GRANULOCYTES NFR BLD AUTO: 0 %
LDLC SERPL CALC-MCNC: 91 MG/DL (ref 0–99)
LYMPHOCYTES # BLD AUTO: 2.3 X10E3/UL (ref 0.7–3.1)
LYMPHOCYTES NFR BLD AUTO: 36 %
MCH RBC QN AUTO: 26.8 PG (ref 26.6–33)
MCHC RBC AUTO-ENTMCNC: 32 G/DL (ref 31.5–35.7)
MCV RBC AUTO: 84 FL (ref 79–97)
MONOCYTES # BLD AUTO: 0.5 X10E3/UL (ref 0.1–0.9)
MONOCYTES NFR BLD AUTO: 8 %
NEUTROPHILS # BLD AUTO: 3.2 X10E3/UL (ref 1.4–7)
NEUTROPHILS NFR BLD AUTO: 52 %
PLATELET # BLD AUTO: 212 X10E3/UL (ref 150–450)
POTASSIUM SERPL-SCNC: 4.4 MMOL/L (ref 3.5–5.2)
PROT SERPL-MCNC: 6.3 G/DL (ref 6–8.5)
RBC # BLD AUTO: 4.85 X10E6/UL (ref 3.77–5.28)
SODIUM SERPL-SCNC: 145 MMOL/L (ref 134–144)
SPECIMEN STATUS REPORT: NORMAL
TRIGL SERPL-MCNC: 71 MG/DL (ref 0–149)
TSH SERPL DL<=0.005 MIU/L-ACNC: 4.86 UIU/ML (ref 0.45–4.5)
VLDLC SERPL CALC-MCNC: 13 MG/DL (ref 5–40)
WBC # BLD AUTO: 6.3 X10E3/UL (ref 3.4–10.8)

## 2023-10-10 RX ORDER — ESTRADIOL 10 UG/1
INSERT VAGINAL
Qty: 24 TABLET | Refills: 3 | Status: SHIPPED | OUTPATIENT
Start: 2023-10-10

## 2023-10-10 RX ORDER — PANTOPRAZOLE SODIUM 40 MG/1
TABLET, DELAYED RELEASE ORAL
Qty: 90 TABLET | Refills: 2 | Status: SHIPPED | OUTPATIENT
Start: 2023-10-10

## 2023-10-19 DIAGNOSIS — M54.50 CHRONIC BILATERAL LOW BACK PAIN WITHOUT SCIATICA: Primary | ICD-10-CM

## 2023-10-19 DIAGNOSIS — G89.29 CHRONIC BILATERAL LOW BACK PAIN WITHOUT SCIATICA: Primary | ICD-10-CM

## 2023-10-19 SDOH — HEALTH STABILITY: PHYSICAL HEALTH
ON AVERAGE, HOW MANY DAYS PER WEEK DO YOU ENGAGE IN MODERATE TO STRENUOUS EXERCISE (LIKE A BRISK WALK)?: PATIENT DECLINED

## 2023-10-20 ENCOUNTER — OFFICE VISIT (OUTPATIENT)
Age: 68
End: 2023-10-20

## 2023-10-20 VITALS — BODY MASS INDEX: 29.71 KG/M2 | HEIGHT: 64 IN | WEIGHT: 174 LBS

## 2023-10-20 DIAGNOSIS — M17.11 PRIMARY OSTEOARTHRITIS OF RIGHT KNEE: Primary | ICD-10-CM

## 2023-10-20 RX ORDER — TRIAMCINOLONE ACETONIDE 40 MG/ML
80 INJECTION, SUSPENSION INTRA-ARTICULAR; INTRAMUSCULAR ONCE
Status: COMPLETED | OUTPATIENT
Start: 2023-10-20 | End: 2023-10-20

## 2023-10-20 RX ADMIN — TRIAMCINOLONE ACETONIDE 80 MG: 40 INJECTION, SUSPENSION INTRA-ARTICULAR; INTRAMUSCULAR at 13:31

## 2023-10-20 NOTE — ASSESSMENT & PLAN NOTE
After obtaining written consent for right knee intra-articular injection the patient was prepped in normal sterile fashion with freeze spray and alcohol. 80mg kenalog and 2mL 2% lidocaine was injected . The needle was withdrawn, the area was cleansed and a sterile bandage was applied. The patient tolerated the procedure well.

## 2023-10-20 NOTE — PROGRESS NOTES
10/20/23 78.9 kg (174 lb)   05/17/23 77.6 kg (171 lb)   11/16/22 78 kg (172 lb)       GENERAL: Alert and oriented x3, in no acute distress. HEENT: Normocephalic, atraumatic. MSK: Right Knee Exam     Tenderness   The patient is experiencing tenderness in the medial joint line and patella. Range of Motion   Extension:  0   Flexion:  130     Tests   Varus: negative Valgus: negative  Drawer:  Anterior - negative    Posterior - negative    Other   Erythema: absent  Sensation: normal  Pulse: present  Swelling: mild  Effusion: effusion present    Comments:  Significant retropatellar crepitus             ASSESSMENT and PLAN:    10/20/23: We will repeat her right knee corticosteroid injection today. If the locking continues and her pain continues we will get an MRI.     10/21/2022:  Right knee corticosteroid injection         No data to display              Tobacco Use: Low Risk  (10/20/2023)    Patient History     Smoking Tobacco Use: Never     Smokeless Tobacco Use: Never     Passive Exposure: Not on file         Past Medical History:   Diagnosis Date    Arthritis     GERD (gastroesophageal reflux disease)     Hypercholesterolemia     Hypothyroidism due to acquired atrophy of thyroid 4/22/2015       Family History   Problem Relation Age of Onset    Dementia Mother     Heart Disease Father     Hypertension Mother     Lung Disease Mother     Diabetes Mother        Current Outpatient Medications   Medication Sig Dispense Refill    Estradiol (VAGIFEM) 10 MCG TABS vaginal tablet INSERT 1 TABLET INTO VAGINA EVERY TUESDAY AND FRIDAY 24 tablet 3    pantoprazole (PROTONIX) 40 MG tablet TAKE 1 TABLET BY MOUTH ONE TIME A DAY 90 tablet 2    alendronate (FOSAMAX) 70 MG tablet TAKE 1 TABLET BY MOUTH ONCE WEEKLY BEFORE BREAKFAST WITH A FULL GLASS OF WATER, ON AN EMPTY STOMACH: REMAIN UPRIGHT FOR 30 MINUTES 12 tablet 3    acetaminophen (TYLENOL) 325 MG tablet Take by mouth every 4 hours as needed      Calcium Carbonate-Vitamin D

## 2023-10-30 SDOH — HEALTH STABILITY: PHYSICAL HEALTH: ON AVERAGE, HOW MANY DAYS PER WEEK DO YOU ENGAGE IN MODERATE TO STRENUOUS EXERCISE (LIKE A BRISK WALK)?: 1 DAY

## 2023-10-30 SDOH — HEALTH STABILITY: PHYSICAL HEALTH: ON AVERAGE, HOW MANY MINUTES DO YOU ENGAGE IN EXERCISE AT THIS LEVEL?: 90 MIN

## 2023-10-30 ASSESSMENT — SOCIAL DETERMINANTS OF HEALTH (SDOH)

## 2023-11-02 ENCOUNTER — OFFICE VISIT (OUTPATIENT)
Age: 68
End: 2023-11-02
Payer: COMMERCIAL

## 2023-11-02 VITALS — WEIGHT: 175 LBS | HEIGHT: 64 IN | BODY MASS INDEX: 29.88 KG/M2

## 2023-11-02 DIAGNOSIS — S32.010A CLOSED COMPRESSION FRACTURE OF BODY OF L1 VERTEBRA (HCC): Primary | ICD-10-CM

## 2023-11-02 DIAGNOSIS — S22.080A CLOSED WEDGE COMPRESSION FRACTURE OF T11 VERTEBRA, INITIAL ENCOUNTER (HCC): ICD-10-CM

## 2023-11-02 PROCEDURE — 1123F ACP DISCUSS/DSCN MKR DOCD: CPT | Performed by: PHYSICAL MEDICINE & REHABILITATION

## 2023-11-02 PROCEDURE — 99203 OFFICE O/P NEW LOW 30 MIN: CPT | Performed by: PHYSICAL MEDICINE & REHABILITATION

## 2023-11-02 ASSESSMENT — PATIENT HEALTH QUESTIONNAIRE - PHQ9
SUM OF ALL RESPONSES TO PHQ QUESTIONS 1-9: 0
2. FEELING DOWN, DEPRESSED OR HOPELESS: 0
1. LITTLE INTEREST OR PLEASURE IN DOING THINGS: 0
SUM OF ALL RESPONSES TO PHQ QUESTIONS 1-9: 0
SUM OF ALL RESPONSES TO PHQ9 QUESTIONS 1 & 2: 0

## 2023-11-02 NOTE — PROGRESS NOTES
Fabrice Munoz presents today for   Chief Complaint   Patient presents with    Back Pain       Is someone accompanying this pt? no    Is the patient using any DME equipment during OV? no    Depression Screening:       No data to display                Learning Assessment:  No flowsheet data found. Abuse Screening:       No data to display                Fall Risk  No flowsheet data found. OPIOID RISK TOOL  No flowsheet data found. Coordination of Care:  1. Have you been to the ER, urgent care clinic since your last visit? no  Hospitalized since your last visit? no    2. Have you seen or consulted any other health care providers outside of the [address] [place] [address] since your last visit? no Include any pap smears or colon screening.  no
of thyroid 4/22/2015      Past Surgical History:   Past Surgical History:   Procedure Laterality Date    GYN      D&C    HAND/FINGER SURGERY UNLISTED  2017    HAND/FINGER SURGERY UNLISTED      trigger release     HAND/FINGER SURGERY UNLISTED      hardware removal     HAND/FINGER SURGERY UNLISTED      osteotomy    HAND/FINGER SURGERY UNLISTED      orif    TONSILLECTOMY      WRIST FRACTURE SURGERY  09/01/2017      SocHx:   Social History     Tobacco Use    Smoking status: Never    Smokeless tobacco: Never   Substance Use Topics    Alcohol use: No      FamHx:? Family History   Problem Relation Age of Onset    Dementia Mother     Heart Disease Father     Hypertension Mother     Lung Disease Mother     Diabetes Mother        Current Medications:   Current Outpatient Medications   Medication Sig Dispense Refill    Estradiol (VAGIFEM) 10 MCG TABS vaginal tablet INSERT 1 TABLET INTO VAGINA EVERY TUESDAY AND FRIDAY 24 tablet 3    pantoprazole (PROTONIX) 40 MG tablet TAKE 1 TABLET BY MOUTH ONE TIME A DAY 90 tablet 2    acetaminophen (TYLENOL) 325 MG tablet Take by mouth every 4 hours as needed      Calcium Carbonate-Vitamin D 600-3. 125 MG-MCG TABS Take 600 mg by mouth daily      Cholecalciferol 50 MCG (2000 UT) TABS Take 2.5 tablets by mouth      co-enzyme Q-10 30 MG capsule Take 200 mg by mouth      hydroxychloroquine (PLAQUENIL) 200 MG tablet Take 2 tablets by mouth daily      loratadine (CLARITIN) 10 MG tablet Take 1 tablet by mouth daily      rosuvastatin (CRESTOR) 20 MG tablet TAKE ONE TABLET BY MOUTH NIGHTLY      alendronate (FOSAMAX) 70 MG tablet TAKE 1 TABLET BY MOUTH ONCE WEEKLY BEFORE BREAKFAST WITH A FULL GLASS OF WATER, ON AN EMPTY STOMACH: REMAIN UPRIGHT FOR 30 MINUTES (Patient not taking: Reported on 11/2/2023) 12 tablet 3     No current facility-administered medications for this visit. Allergies:     Allergies   Allergen Reactions    Sulfasalazine Rash     Other reaction(s): rash/itching    Iodinated

## 2023-11-12 ENCOUNTER — HOSPITAL ENCOUNTER (OUTPATIENT)
Facility: HOSPITAL | Age: 68
Discharge: HOME | End: 2023-11-15
Attending: PHYSICAL MEDICINE & REHABILITATION
Payer: COMMERCIAL

## 2023-11-12 DIAGNOSIS — S22.080A CLOSED WEDGE COMPRESSION FRACTURE OF T11 VERTEBRA, INITIAL ENCOUNTER (HCC): ICD-10-CM

## 2023-11-12 DIAGNOSIS — S32.010A CLOSED COMPRESSION FRACTURE OF BODY OF L1 VERTEBRA (HCC): ICD-10-CM

## 2023-11-12 PROCEDURE — 72148 MRI LUMBAR SPINE W/O DYE: CPT

## 2023-11-15 ENCOUNTER — OFFICE VISIT (OUTPATIENT)
Facility: CLINIC | Age: 68
End: 2023-11-15
Payer: COMMERCIAL

## 2023-11-15 VITALS
HEART RATE: 75 BPM | DIASTOLIC BLOOD PRESSURE: 68 MMHG | SYSTOLIC BLOOD PRESSURE: 105 MMHG | WEIGHT: 170 LBS | RESPIRATION RATE: 16 BRPM | TEMPERATURE: 98.4 F | HEIGHT: 64 IN | BODY MASS INDEX: 29.02 KG/M2 | OXYGEN SATURATION: 98 %

## 2023-11-15 DIAGNOSIS — K21.9 GASTROESOPHAGEAL REFLUX DISEASE WITHOUT ESOPHAGITIS: ICD-10-CM

## 2023-11-15 DIAGNOSIS — N95.2 ATROPHIC VAGINITIS: ICD-10-CM

## 2023-11-15 DIAGNOSIS — M41.9 SCOLIOSIS OF THORACOLUMBAR SPINE, UNSPECIFIED SCOLIOSIS TYPE: ICD-10-CM

## 2023-11-15 DIAGNOSIS — R73.03 PREDIABETES: ICD-10-CM

## 2023-11-15 DIAGNOSIS — M51.36 DDD (DEGENERATIVE DISC DISEASE), LUMBAR: ICD-10-CM

## 2023-11-15 DIAGNOSIS — E03.4 HYPOTHYROIDISM DUE TO ACQUIRED ATROPHY OF THYROID: ICD-10-CM

## 2023-11-15 DIAGNOSIS — E55.9 VITAMIN D DEFICIENCY: ICD-10-CM

## 2023-11-15 DIAGNOSIS — Z00.00 WELL WOMAN EXAM (NO GYNECOLOGICAL EXAM): Primary | ICD-10-CM

## 2023-11-15 DIAGNOSIS — R10.13 EPIGASTRIC PAIN: ICD-10-CM

## 2023-11-15 DIAGNOSIS — M19.90 INFLAMMATORY OSTEOARTHRITIS: ICD-10-CM

## 2023-11-15 DIAGNOSIS — E78.5 HYPERLIPIDEMIA, UNSPECIFIED HYPERLIPIDEMIA TYPE: ICD-10-CM

## 2023-11-15 PROCEDURE — 99397 PER PM REEVAL EST PAT 65+ YR: CPT | Performed by: INTERNAL MEDICINE

## 2023-11-15 ASSESSMENT — PATIENT HEALTH QUESTIONNAIRE - PHQ9
SUM OF ALL RESPONSES TO PHQ9 QUESTIONS 1 & 2: 0
2. FEELING DOWN, DEPRESSED OR HOPELESS: 0
SUM OF ALL RESPONSES TO PHQ QUESTIONS 1-9: 0
1. LITTLE INTEREST OR PLEASURE IN DOING THINGS: 0
SUM OF ALL RESPONSES TO PHQ QUESTIONS 1-9: 0

## 2023-11-15 NOTE — PROGRESS NOTES
Assessment/ Plan: Rozina Leija was seen today for follow-up chronic condition and annual exam.    Diagnoses and all orders for this visit:    Well woman exam (no gynecological exam)-Advised re: monthly self breast exam, dental prophylaxis Q 6 months, regular exercise, yearly eye exam, daily intake of Ca+D   -     CBC; Future  -     Comprehensive Metabolic Panel; Future  -     Lipid Panel; Future  -     Hemoglobin A1C; Future  -     TSH; Future    Epigastric pain-etio? Could be from reflux but need to rule out other possible causes  -     External Referral To Gastroenterology  -     CT ABDOMEN WO CONTRAST Additional Contrast? None; Future    Gastroesophageal reflux disease without esophagitis-worsening sxs despite daily PPI-needs to be seen by Gi for possible EGD  -     CBC  -     External Referral To Gastroenterology    Prediabetes-continue with efforts at weight loss  -     Hemoglobin A1C    Vitamin D deficiency-on OTC  -     Vitamin D 25 Hydroxy    Hypothyroidism due to acquired atrophy of thyroid-will start Synthroid if TSH is over 10  -     TSH    Hyperlipidemia, unspecified hyperlipidemia type-goal LDL of less than 100 on Crestor  -     Lipid Panel  -     Comprehensive Metabolic Panel    DDD (degenerative disc disease), lumbar-Physiatry following    Scoliosis of thoracolumbar spine, unspecified scoliosis type-as above    Inflammatory osteoarthritis-on Plaquenil c/o rheum; had side effects on Methotrexate    Atrophic vaginitis-on Vagifem    Osteoporosis-currently on drug holiday after taking Fosamax for over 5 yrs; Rheum also following her for this        Follow-up and Dispositions    Return in about 6 months (around 5/15/2024) for follow up.                      Chief Complaint   Patient presents with    Follow-up Chronic Condition     6 month    Annual Exam       Pt is a 70y.o. year old female who presents for follow up of her chronic medical problems/annual wellness visit    Health Maintenance Due   Topic Date

## 2023-11-15 NOTE — PROGRESS NOTES
1. \"Have you been to the ER, urgent care clinic since your last visit? Hospitalized since your last visit? \" No    2. \"Have you seen or consulted any other health care providers outside of the [address] [place] [address] since your last visit? \" Yes Dr. Toro Ramey for knees, Dr. Michael Conway for her back    3. For patients aged 43-73: Has the patient had a colonoscopy / FIT/ Cologuard? Yes No care gap present      If the patient is female:    4. For patients aged 43-66: Has the patient had a mammogram within the past 2 years? Yes - Care Gap present. Most recent result on file    5. For patients aged 21-65: Has the patient had a pap smear?  NA - based on age or sex    Health Maintenance Due   Topic Date Due    Flu vaccine (1) 08/01/2023    Breast cancer screen  11/02/2023

## 2023-11-16 PROBLEM — M51.36 DDD (DEGENERATIVE DISC DISEASE), LUMBAR: Status: ACTIVE | Noted: 2023-11-16

## 2023-11-16 PROBLEM — M51.369 DDD (DEGENERATIVE DISC DISEASE), LUMBAR: Status: ACTIVE | Noted: 2023-11-16

## 2023-11-16 PROBLEM — R73.03 PREDIABETES: Status: ACTIVE | Noted: 2023-11-16

## 2023-11-16 PROBLEM — M41.9 SCOLIOSIS OF THORACOLUMBAR SPINE: Status: ACTIVE | Noted: 2023-11-16

## 2023-11-16 LAB
25(OH)D3+25(OH)D2 SERPL-MCNC: 55.7 NG/ML (ref 30–100)
ALBUMIN SERPL-MCNC: 4.2 G/DL (ref 3.9–4.9)
ALBUMIN/GLOB SERPL: 2.1 {RATIO} (ref 1.2–2.2)
ALP SERPL-CCNC: 66 IU/L (ref 44–121)
ALT SERPL-CCNC: 30 IU/L (ref 0–32)
AST SERPL-CCNC: 34 IU/L (ref 0–40)
BASOPHILS # BLD AUTO: 0 X10E3/UL (ref 0–0.2)
BASOPHILS NFR BLD AUTO: 1 %
BILIRUB SERPL-MCNC: <0.2 MG/DL (ref 0–1.2)
BUN SERPL-MCNC: 22 MG/DL (ref 8–27)
BUN/CREAT SERPL: 23 (ref 12–28)
CALCIUM SERPL-MCNC: 9.3 MG/DL (ref 8.7–10.3)
CHLORIDE SERPL-SCNC: 104 MMOL/L (ref 96–106)
CHOLEST SERPL-MCNC: 190 MG/DL (ref 100–199)
CO2 SERPL-SCNC: 22 MMOL/L (ref 20–29)
CREAT SERPL-MCNC: 0.95 MG/DL (ref 0.57–1)
EGFRCR SERPLBLD CKD-EPI 2021: 65 ML/MIN/1.73
EOSINOPHIL # BLD AUTO: 0.1 X10E3/UL (ref 0–0.4)
EOSINOPHIL NFR BLD AUTO: 2 %
ERYTHROCYTE [DISTWIDTH] IN BLOOD BY AUTOMATED COUNT: 13.8 % (ref 11.7–15.4)
GLOBULIN SER CALC-MCNC: 2 G/DL (ref 1.5–4.5)
GLUCOSE SERPL-MCNC: 90 MG/DL (ref 70–99)
HBA1C MFR BLD: 6.2 % (ref 4.8–5.6)
HCT VFR BLD AUTO: 38.2 % (ref 34–46.6)
HDLC SERPL-MCNC: 78 MG/DL
HGB BLD-MCNC: 12.5 G/DL (ref 11.1–15.9)
IMM GRANULOCYTES # BLD AUTO: 0 X10E3/UL (ref 0–0.1)
IMM GRANULOCYTES NFR BLD AUTO: 0 %
LDLC SERPL CALC-MCNC: 97 MG/DL (ref 0–99)
LYMPHOCYTES # BLD AUTO: 1.6 X10E3/UL (ref 0.7–3.1)
LYMPHOCYTES NFR BLD AUTO: 29 %
MCH RBC QN AUTO: 27.4 PG (ref 26.6–33)
MCHC RBC AUTO-ENTMCNC: 32.7 G/DL (ref 31.5–35.7)
MCV RBC AUTO: 84 FL (ref 79–97)
MONOCYTES # BLD AUTO: 0.6 X10E3/UL (ref 0.1–0.9)
MONOCYTES NFR BLD AUTO: 12 %
NEUTROPHILS # BLD AUTO: 3 X10E3/UL (ref 1.4–7)
NEUTROPHILS NFR BLD AUTO: 56 %
PLATELET # BLD AUTO: 181 X10E3/UL (ref 150–450)
POTASSIUM SERPL-SCNC: 4.2 MMOL/L (ref 3.5–5.2)
PROT SERPL-MCNC: 6.2 G/DL (ref 6–8.5)
RBC # BLD AUTO: 4.57 X10E6/UL (ref 3.77–5.28)
SODIUM SERPL-SCNC: 141 MMOL/L (ref 134–144)
SPECIMEN STATUS REPORT: NORMAL
TRIGL SERPL-MCNC: 86 MG/DL (ref 0–149)
TSH SERPL DL<=0.005 MIU/L-ACNC: 3.52 UIU/ML (ref 0.45–4.5)
VLDLC SERPL CALC-MCNC: 15 MG/DL (ref 5–40)
WBC # BLD AUTO: 5.4 X10E3/UL (ref 3.4–10.8)

## 2023-11-30 DIAGNOSIS — R10.13 EPIGASTRIC PAIN: Primary | ICD-10-CM

## 2023-11-30 DIAGNOSIS — Z91.041 CONTRAST MEDIA ALLERGY: ICD-10-CM

## 2023-11-30 RX ORDER — PREDNISONE 10 MG/1
TABLET ORAL
Qty: 15 TABLET | Refills: 0 | Status: SHIPPED | OUTPATIENT
Start: 2023-11-30

## 2023-12-02 ENCOUNTER — HOSPITAL ENCOUNTER (OUTPATIENT)
Facility: HOSPITAL | Age: 68
End: 2023-12-02
Attending: INTERNAL MEDICINE
Payer: COMMERCIAL

## 2023-12-02 DIAGNOSIS — R10.13 EPIGASTRIC PAIN: ICD-10-CM

## 2023-12-02 PROCEDURE — 6360000004 HC RX CONTRAST MEDICATION: Performed by: INTERNAL MEDICINE

## 2023-12-02 PROCEDURE — 74160 CT ABDOMEN W/CONTRAST: CPT

## 2023-12-02 RX ADMIN — IOPAMIDOL 100 ML: 612 INJECTION, SOLUTION INTRAVENOUS at 13:45

## 2024-01-02 RX ORDER — PANTOPRAZOLE SODIUM 40 MG/1
40 TABLET, DELAYED RELEASE ORAL DAILY
Qty: 90 TABLET | Refills: 2 | Status: SHIPPED | OUTPATIENT
Start: 2024-01-02

## 2024-01-02 RX ORDER — ROSUVASTATIN CALCIUM 20 MG/1
20 TABLET, COATED ORAL NIGHTLY
Qty: 90 TABLET | Refills: 2 | Status: SHIPPED | OUTPATIENT
Start: 2024-01-02

## 2024-05-20 ENCOUNTER — OFFICE VISIT (OUTPATIENT)
Facility: CLINIC | Age: 69
End: 2024-05-20
Payer: COMMERCIAL

## 2024-05-20 VITALS
SYSTOLIC BLOOD PRESSURE: 122 MMHG | WEIGHT: 172 LBS | BODY MASS INDEX: 29.37 KG/M2 | DIASTOLIC BLOOD PRESSURE: 66 MMHG | OXYGEN SATURATION: 98 % | RESPIRATION RATE: 16 BRPM | HEIGHT: 64 IN | TEMPERATURE: 98.3 F | HEART RATE: 69 BPM

## 2024-05-20 DIAGNOSIS — R19.7 DIARRHEA, UNSPECIFIED TYPE: ICD-10-CM

## 2024-05-20 DIAGNOSIS — M17.11 PRIMARY OSTEOARTHRITIS OF RIGHT KNEE: ICD-10-CM

## 2024-05-20 DIAGNOSIS — M19.90 INFLAMMATORY OSTEOARTHRITIS: ICD-10-CM

## 2024-05-20 DIAGNOSIS — K21.9 GASTROESOPHAGEAL REFLUX DISEASE WITHOUT ESOPHAGITIS: ICD-10-CM

## 2024-05-20 DIAGNOSIS — E03.4 HYPOTHYROIDISM DUE TO ACQUIRED ATROPHY OF THYROID: ICD-10-CM

## 2024-05-20 DIAGNOSIS — E78.5 HYPERLIPIDEMIA, UNSPECIFIED HYPERLIPIDEMIA TYPE: Primary | ICD-10-CM

## 2024-05-20 DIAGNOSIS — R73.03 PREDIABETES: ICD-10-CM

## 2024-05-20 DIAGNOSIS — K44.9 HIATAL HERNIA: ICD-10-CM

## 2024-05-20 DIAGNOSIS — M51.36 DDD (DEGENERATIVE DISC DISEASE), LUMBAR: ICD-10-CM

## 2024-05-20 DIAGNOSIS — E55.9 VITAMIN D DEFICIENCY: ICD-10-CM

## 2024-05-20 PROCEDURE — 1123F ACP DISCUSS/DSCN MKR DOCD: CPT | Performed by: INTERNAL MEDICINE

## 2024-05-20 PROCEDURE — 99214 OFFICE O/P EST MOD 30 MIN: CPT | Performed by: INTERNAL MEDICINE

## 2024-05-20 SDOH — ECONOMIC STABILITY: FOOD INSECURITY: WITHIN THE PAST 12 MONTHS, THE FOOD YOU BOUGHT JUST DIDN'T LAST AND YOU DIDN'T HAVE MONEY TO GET MORE.: PATIENT DECLINED

## 2024-05-20 SDOH — ECONOMIC STABILITY: HOUSING INSECURITY
IN THE LAST 12 MONTHS, WAS THERE A TIME WHEN YOU DID NOT HAVE A STEADY PLACE TO SLEEP OR SLEPT IN A SHELTER (INCLUDING NOW)?: PATIENT DECLINED

## 2024-05-20 SDOH — ECONOMIC STABILITY: INCOME INSECURITY: HOW HARD IS IT FOR YOU TO PAY FOR THE VERY BASICS LIKE FOOD, HOUSING, MEDICAL CARE, AND HEATING?: PATIENT DECLINED

## 2024-05-20 ASSESSMENT — PATIENT HEALTH QUESTIONNAIRE - PHQ9
SUM OF ALL RESPONSES TO PHQ QUESTIONS 1-9: 0
2. FEELING DOWN, DEPRESSED OR HOPELESS: NOT AT ALL
SUM OF ALL RESPONSES TO PHQ QUESTIONS 1-9: 0
1. LITTLE INTEREST OR PLEASURE IN DOING THINGS: NOT AT ALL
SUM OF ALL RESPONSES TO PHQ QUESTIONS 1-9: 0
SUM OF ALL RESPONSES TO PHQ9 QUESTIONS 1 & 2: 0
SUM OF ALL RESPONSES TO PHQ QUESTIONS 1-9: 0

## 2024-05-20 NOTE — PROGRESS NOTES
Assessment/ Plan:   Dana was seen today for follow-up chronic condition.    Diagnoses and all orders for this visit:    Hyperlipidemia, unspecified hyperlipidemia type-goal LDL of less than 70 on Crestor  -     Lipid Panel; Future    Diarrhea, unspecified type-loose stools after recent trip to [place]  -     Ova and Parasite Examination; Future  -     Gastrointestinal Panel, Molecular; Future    Prediabetes-will start med if A1c is above 7%  -     Comprehensive Metabolic Panel; Future  -     Hemoglobin A1C; Future    Vitamin D deficiency-on OTC  -     Vitamin D 25 Hydroxy; Future    Hypothyroidism due to acquired atrophy of thyroid-will start med if TSH is above 10  -     TSH; Future    Hiatal hernia-will consult with GI as she has sxs despite daily PPI  -     CBC; Future    Gastroesophageal reflux disease without esophagitis-for possible EGD as reflux sxs persist despite daily PPI    Inflammatory osteoarthritis-sees Rheum; s/p inj on the fingers and on Plaquenil    DDD (degenerative disc disease), lumbar-saw Physiatry; MRI did not show compression fracture    Primary osteoarthritis of right knee-s/p inj by Ortho with good results    Dexa done 12/2022-osteopenia; order next visit    Colonoscopy coming up; possible EGD    Follow-up and Dispositions    Return in about 6 months (around 11/20/2024) for follow up.                     Chief Complaint   Patient presents with    Follow-up Chronic Condition       Pt is a 69 y.o. year old female who presents for follow up of her chronic medical problems    Health Maintenance Due   Topic Date Due    Respiratory Syncytial Virus (RSV) Pregnant or age 60 yrs+ (1 - 1-dose 60+ series)-declines Never done    COVID-19 Vaccine (9 - 2023-24 season) 12/03/2023    Colorectal Cancer Screen -already scheduled for colonoscopy/EGD 05/10/2024      Wt Readings from Last 3 Encounters:   05/20/24 78 kg (172 lb)   11/15/23 77.1 kg (170 lb)   11/02/23 79.4 kg (175 lb)        BP Readings from Last

## 2024-05-20 NOTE — PROGRESS NOTES
\"Have you been to the ER, urgent care clinic since your last visit?  Hospitalized since your last visit?\"    NO    “Have you seen or consulted any other health care providers outside of [facility] since your last visit?”    NO        “Have you had a colorectal cancer screening such as a colonoscopy/FIT/Cologuard?    NO    Date of last Colonoscopy: 5/10/2019  No cologuard on file  No FIT/FOBT on file   No flexible sigmoidoscopy on file         Click Here for Release of Records Request

## 2024-05-21 LAB
BASOPHILS # BLD AUTO: 0.1 X10E3/UL (ref 0–0.2)
BASOPHILS NFR BLD AUTO: 1 %
EOSINOPHIL # BLD AUTO: 0.2 X10E3/UL (ref 0–0.4)
EOSINOPHIL NFR BLD AUTO: 3 %
ERYTHROCYTE [DISTWIDTH] IN BLOOD BY AUTOMATED COUNT: 14.7 % (ref 11.7–15.4)
HBA1C MFR BLD: 6.2 % (ref 4.8–5.6)
HCT VFR BLD AUTO: 39.6 % (ref 34–46.6)
HGB BLD-MCNC: 12.5 G/DL (ref 11.1–15.9)
IMM GRANULOCYTES # BLD AUTO: 0 X10E3/UL (ref 0–0.1)
IMM GRANULOCYTES NFR BLD AUTO: 0 %
LYMPHOCYTES # BLD AUTO: 1.9 X10E3/UL (ref 0.7–3.1)
LYMPHOCYTES NFR BLD AUTO: 31 %
MCH RBC QN AUTO: 25.6 PG (ref 26.6–33)
MCHC RBC AUTO-ENTMCNC: 31.6 G/DL (ref 31.5–35.7)
MCV RBC AUTO: 81 FL (ref 79–97)
MONOCYTES # BLD AUTO: 0.5 X10E3/UL (ref 0.1–0.9)
MONOCYTES NFR BLD AUTO: 9 %
NEUTROPHILS # BLD AUTO: 3.4 X10E3/UL (ref 1.4–7)
NEUTROPHILS NFR BLD AUTO: 56 %
PLATELET # BLD AUTO: 225 X10E3/UL (ref 150–450)
RBC # BLD AUTO: 4.89 X10E6/UL (ref 3.77–5.28)
SPECIMEN STATUS REPORT: NORMAL
WBC # BLD AUTO: 6 X10E3/UL (ref 3.4–10.8)

## 2024-05-22 DIAGNOSIS — R73.03 PREDIABETES: Primary | ICD-10-CM

## 2024-05-22 DIAGNOSIS — E83.52 HYPERCALCEMIA: ICD-10-CM

## 2024-05-22 LAB
25(OH)D3+25(OH)D2 SERPL-MCNC: 66.1 NG/ML (ref 30–100)
ALBUMIN SERPL-MCNC: 4.4 G/DL (ref 3.9–4.9)
ALBUMIN/GLOB SERPL: 2.1 {RATIO} (ref 1.2–2.2)
ALP SERPL-CCNC: 78 IU/L (ref 44–121)
ALT SERPL-CCNC: 27 IU/L (ref 0–32)
AST SERPL-CCNC: 36 IU/L (ref 0–40)
BILIRUB SERPL-MCNC: <0.2 MG/DL (ref 0–1.2)
BUN SERPL-MCNC: 17 MG/DL (ref 8–27)
BUN/CREAT SERPL: 15 (ref 12–28)
CALCIUM SERPL-MCNC: 10.6 MG/DL (ref 8.7–10.3)
CHLORIDE SERPL-SCNC: 104 MMOL/L (ref 96–106)
CHOLEST SERPL-MCNC: 184 MG/DL (ref 100–199)
CO2 SERPL-SCNC: 22 MMOL/L (ref 20–29)
CREAT SERPL-MCNC: 1.12 MG/DL (ref 0.57–1)
EGFRCR SERPLBLD CKD-EPI 2021: 53 ML/MIN/1.73
GLOBULIN SER CALC-MCNC: 2.1 G/DL (ref 1.5–4.5)
GLUCOSE SERPL-MCNC: 101 MG/DL (ref 70–99)
HDLC SERPL-MCNC: 84 MG/DL
IMP & REVIEW OF LAB RESULTS: NORMAL
LDLC SERPL CALC-MCNC: 84 MG/DL (ref 0–99)
POTASSIUM SERPL-SCNC: 4.4 MMOL/L (ref 3.5–5.2)
PROT SERPL-MCNC: 6.5 G/DL (ref 6–8.5)
REPORT: NORMAL
REPORT: NORMAL
SODIUM SERPL-SCNC: 142 MMOL/L (ref 134–144)
TRIGL SERPL-MCNC: 89 MG/DL (ref 0–149)
TSH SERPL DL<=0.005 MIU/L-ACNC: 5.38 UIU/ML (ref 0.45–4.5)
VLDLC SERPL CALC-MCNC: 16 MG/DL (ref 5–40)

## 2024-05-25 LAB

## 2024-05-28 LAB — O+P SPEC MICRO: NORMAL

## 2024-05-29 LAB
BUN SERPL-MCNC: 14 MG/DL (ref 8–27)
BUN/CREAT SERPL: 15 (ref 12–28)
CALCIUM SERPL-MCNC: 9.4 MG/DL (ref 8.7–10.3)
CHLORIDE SERPL-SCNC: 104 MMOL/L (ref 96–106)
CO2 SERPL-SCNC: 22 MMOL/L (ref 20–29)
CREAT SERPL-MCNC: 0.91 MG/DL (ref 0.57–1)
EGFRCR SERPLBLD CKD-EPI 2021: 68 ML/MIN/1.73
GLUCOSE SERPL-MCNC: 103 MG/DL (ref 70–99)
POTASSIUM SERPL-SCNC: 4.1 MMOL/L (ref 3.5–5.2)
SODIUM SERPL-SCNC: 142 MMOL/L (ref 134–144)
SPECIMEN STATUS REPORT: NORMAL

## 2024-09-24 RX ORDER — ESTRADIOL 10 UG/1
INSERT VAGINAL
Qty: 24 TABLET | Refills: 3 | Status: SHIPPED | OUTPATIENT
Start: 2024-09-24

## 2024-09-24 RX ORDER — ESTRADIOL 10 UG/1
INSERT VAGINAL
Qty: 24 TABLET | Refills: 3 | OUTPATIENT
Start: 2024-09-24

## 2024-09-24 RX ORDER — ROSUVASTATIN CALCIUM 20 MG/1
20 TABLET, COATED ORAL NIGHTLY
Qty: 90 TABLET | Refills: 2 | Status: SHIPPED | OUTPATIENT
Start: 2024-09-24

## 2024-09-24 RX ORDER — ROSUVASTATIN CALCIUM 20 MG/1
20 TABLET, COATED ORAL NIGHTLY
Qty: 90 TABLET | Refills: 2 | OUTPATIENT
Start: 2024-09-24

## 2024-10-04 ENCOUNTER — LAB (OUTPATIENT)
Facility: CLINIC | Age: 69
End: 2024-10-04

## 2024-11-18 ENCOUNTER — OFFICE VISIT (OUTPATIENT)
Facility: CLINIC | Age: 69
End: 2024-11-18
Payer: COMMERCIAL

## 2024-11-18 ENCOUNTER — HOSPITAL ENCOUNTER (OUTPATIENT)
Facility: HOSPITAL | Age: 69
Setting detail: SPECIMEN
Discharge: HOME | End: 2024-11-21

## 2024-11-18 VITALS
DIASTOLIC BLOOD PRESSURE: 77 MMHG | RESPIRATION RATE: 18 BRPM | OXYGEN SATURATION: 98 % | TEMPERATURE: 98.1 F | WEIGHT: 174.6 LBS | SYSTOLIC BLOOD PRESSURE: 124 MMHG | HEART RATE: 79 BPM | HEIGHT: 64 IN | BODY MASS INDEX: 29.81 KG/M2

## 2024-11-18 DIAGNOSIS — Z12.31 ENCOUNTER FOR SCREENING MAMMOGRAM FOR BREAST CANCER: ICD-10-CM

## 2024-11-18 DIAGNOSIS — Z00.00 WELL WOMAN EXAM (NO GYNECOLOGICAL EXAM): Primary | ICD-10-CM

## 2024-11-18 DIAGNOSIS — R06.09 DOE (DYSPNEA ON EXERTION): ICD-10-CM

## 2024-11-18 DIAGNOSIS — Z13.820 SCREENING FOR OSTEOPOROSIS: ICD-10-CM

## 2024-11-18 DIAGNOSIS — K44.9 HIATAL HERNIA: ICD-10-CM

## 2024-11-18 LAB — LABCORP SPECIMEN COLLECTION: NORMAL

## 2024-11-18 PROCEDURE — 99397 PER PM REEVAL EST PAT 65+ YR: CPT | Performed by: INTERNAL MEDICINE

## 2024-11-18 PROCEDURE — 93000 ELECTROCARDIOGRAM COMPLETE: CPT | Performed by: INTERNAL MEDICINE

## 2024-11-18 PROCEDURE — 99001 SPECIMEN HANDLING PT-LAB: CPT

## 2024-11-18 ASSESSMENT — PATIENT HEALTH QUESTIONNAIRE - PHQ9
2. FEELING DOWN, DEPRESSED OR HOPELESS: NOT AT ALL
SUM OF ALL RESPONSES TO PHQ QUESTIONS 1-9: 0
SUM OF ALL RESPONSES TO PHQ QUESTIONS 1-9: 0
1. LITTLE INTEREST OR PLEASURE IN DOING THINGS: NOT AT ALL
SUM OF ALL RESPONSES TO PHQ9 QUESTIONS 1 & 2: 0
SUM OF ALL RESPONSES TO PHQ QUESTIONS 1-9: 0
SUM OF ALL RESPONSES TO PHQ QUESTIONS 1-9: 0

## 2024-11-18 NOTE — PROGRESS NOTES
intended plan as seen in the above orders.  The patient has received an After-Visit Summary and questions were answered concerning future plans.     Medication Side Effects and Warnings were discussed with patient: yes    Patient verbalized understanding of above instructions.    Savana Mcgarry MD  Internal Medicine  [facility]

## 2024-11-19 LAB
25(OH)D3+25(OH)D2 SERPL-MCNC: 60.6 NG/ML (ref 30–100)
ALBUMIN SERPL-MCNC: 4.4 G/DL (ref 3.9–4.9)
ALP SERPL-CCNC: 77 IU/L (ref 44–121)
ALT SERPL-CCNC: 25 IU/L (ref 0–32)
AST SERPL-CCNC: 35 IU/L (ref 0–40)
BASOPHILS # BLD AUTO: 0 X10E3/UL (ref 0–0.2)
BASOPHILS NFR BLD AUTO: 1 %
BILIRUB SERPL-MCNC: <0.2 MG/DL (ref 0–1.2)
BUN SERPL-MCNC: 16 MG/DL (ref 8–27)
BUN/CREAT SERPL: 19 (ref 12–28)
CALCIUM SERPL-MCNC: 9.6 MG/DL (ref 8.7–10.3)
CHLORIDE SERPL-SCNC: 104 MMOL/L (ref 96–106)
CHOLEST SERPL-MCNC: 178 MG/DL (ref 100–199)
CO2 SERPL-SCNC: 23 MMOL/L (ref 20–29)
CREAT SERPL-MCNC: 0.86 MG/DL (ref 0.57–1)
EGFRCR SERPLBLD CKD-EPI 2021: 73 ML/MIN/1.73
EOSINOPHIL # BLD AUTO: 0.2 X10E3/UL (ref 0–0.4)
EOSINOPHIL NFR BLD AUTO: 3 %
ERYTHROCYTE [DISTWIDTH] IN BLOOD BY AUTOMATED COUNT: 14.1 % (ref 11.7–15.4)
GLOBULIN SER CALC-MCNC: 2.1 G/DL (ref 1.5–4.5)
GLUCOSE SERPL-MCNC: 100 MG/DL (ref 70–99)
HBA1C MFR BLD: 6.3 % (ref 4.8–5.6)
HCT VFR BLD AUTO: 38.4 % (ref 34–46.6)
HDLC SERPL-MCNC: 76 MG/DL
HGB BLD-MCNC: 11.9 G/DL (ref 11.1–15.9)
IMM GRANULOCYTES # BLD AUTO: 0 X10E3/UL (ref 0–0.1)
IMM GRANULOCYTES NFR BLD AUTO: 0 %
IMP & REVIEW OF LAB RESULTS: NORMAL
LDLC SERPL CALC-MCNC: 82 MG/DL (ref 0–99)
LYMPHOCYTES # BLD AUTO: 1.9 X10E3/UL (ref 0.7–3.1)
LYMPHOCYTES NFR BLD AUTO: 28 %
MCH RBC QN AUTO: 24.5 PG (ref 26.6–33)
MCHC RBC AUTO-ENTMCNC: 31 G/DL (ref 31.5–35.7)
MCV RBC AUTO: 79 FL (ref 79–97)
MONOCYTES # BLD AUTO: 0.6 X10E3/UL (ref 0.1–0.9)
MONOCYTES NFR BLD AUTO: 9 %
NEUTROPHILS # BLD AUTO: 3.9 X10E3/UL (ref 1.4–7)
NEUTROPHILS NFR BLD AUTO: 59 %
PLATELET # BLD AUTO: 264 X10E3/UL (ref 150–450)
POTASSIUM SERPL-SCNC: 4.5 MMOL/L (ref 3.5–5.2)
PROT SERPL-MCNC: 6.5 G/DL (ref 6–8.5)
RBC # BLD AUTO: 4.86 X10E6/UL (ref 3.77–5.28)
SODIUM SERPL-SCNC: 141 MMOL/L (ref 134–144)
SPECIMEN STATUS REPORT: NORMAL
TRIGL SERPL-MCNC: 114 MG/DL (ref 0–149)
TSH SERPL DL<=0.005 MIU/L-ACNC: 4.58 UIU/ML (ref 0.45–4.5)
VLDLC SERPL CALC-MCNC: 20 MG/DL (ref 5–40)
WBC # BLD AUTO: 6.7 X10E3/UL (ref 3.4–10.8)

## 2024-12-13 ENCOUNTER — HOSPITAL ENCOUNTER (OUTPATIENT)
Facility: HOSPITAL | Age: 69
Discharge: HOME | End: 2024-12-16
Attending: INTERNAL MEDICINE
Payer: COMMERCIAL

## 2024-12-13 DIAGNOSIS — Z13.820 SCREENING FOR OSTEOPOROSIS: ICD-10-CM

## 2024-12-13 PROCEDURE — 77080 DXA BONE DENSITY AXIAL: CPT

## 2025-06-02 DIAGNOSIS — R06.02 SHORTNESS OF BREATH: Primary | ICD-10-CM

## 2025-06-02 DIAGNOSIS — R06.09 DYSPNEA ON EXERTION: Primary | ICD-10-CM

## 2025-06-20 RX ORDER — ROSUVASTATIN CALCIUM 20 MG/1
20 TABLET, COATED ORAL NIGHTLY
Qty: 90 TABLET | Refills: 2 | Status: SHIPPED | OUTPATIENT
Start: 2025-06-20

## 2025-06-20 NOTE — TELEPHONE ENCOUNTER
Requested Prescriptions     Pending Prescriptions Disp Refills    rosuvastatin (CRESTOR) 20 MG tablet [Pharmacy Med Name: ROSUVASTATIN CALCIUM 20MG TABS] 90 tablet 2     Sig: TAKE ONE TABLET BY MOUTH EVERY NIGHT     Last seen: 11/18/24  Next seen: 9/8/25    Last filled: 9/24/24 Qty 90 w/2 refills

## 2025-06-30 ENCOUNTER — OFFICE VISIT (OUTPATIENT)
Age: 70
End: 2025-06-30
Payer: COMMERCIAL

## 2025-06-30 VITALS — BODY MASS INDEX: 30.22 KG/M2 | WEIGHT: 177 LBS | HEIGHT: 64 IN

## 2025-06-30 DIAGNOSIS — M17.11 PRIMARY OSTEOARTHRITIS OF RIGHT KNEE: Primary | ICD-10-CM

## 2025-06-30 PROCEDURE — 20610 DRAIN/INJ JOINT/BURSA W/O US: CPT | Performed by: ORTHOPAEDIC SURGERY

## 2025-06-30 PROCEDURE — 73564 X-RAY EXAM KNEE 4 OR MORE: CPT | Performed by: ORTHOPAEDIC SURGERY

## 2025-06-30 PROCEDURE — 1123F ACP DISCUSS/DSCN MKR DOCD: CPT | Performed by: ORTHOPAEDIC SURGERY

## 2025-06-30 PROCEDURE — 99213 OFFICE O/P EST LOW 20 MIN: CPT | Performed by: ORTHOPAEDIC SURGERY

## 2025-06-30 RX ORDER — TRIAMCINOLONE ACETONIDE 40 MG/ML
80 INJECTION, SUSPENSION INTRA-ARTICULAR; INTRAMUSCULAR ONCE
Status: COMPLETED | OUTPATIENT
Start: 2025-06-30 | End: 2025-06-30

## 2025-06-30 RX ORDER — LIDOCAINE HYDROCHLORIDE 10 MG/ML
2 INJECTION, SOLUTION INFILTRATION; PERINEURAL ONCE
Status: COMPLETED | OUTPATIENT
Start: 2025-06-30 | End: 2025-06-30

## 2025-06-30 RX ADMIN — TRIAMCINOLONE ACETONIDE 80 MG: 40 INJECTION, SUSPENSION INTRA-ARTICULAR; INTRAMUSCULAR at 14:36

## 2025-06-30 RX ADMIN — LIDOCAINE HYDROCHLORIDE 2 ML: 10 INJECTION, SOLUTION INFILTRATION; PERINEURAL at 14:35

## 2025-06-30 NOTE — PROGRESS NOTES
Patient: Dana Richards                MRN: [id number]       SSN: [id number]  YOB: 1955        AGE: 70 y.o.        SEX: female  BMI: Body mass index is 30.38 kg/m².    PCP: Ximena Mcgarry MD  06/30/25    1. Primary osteoarthritis of right knee  Assessment & Plan:  I explained the risks of injection/aspiration including but not limited to infection, pain, skin discoloration, and flushing. After obtaining written consent for right knee intra-articular injection the patient was prepped in normal sterile fashion with freeze spray and alcohol.  80mg kenalog and 2mL 2% lidocaine was injected .  The needle was withdrawn, the area was cleansed and a sterile bandage was applied.  The patient tolerated the procedure well.     Orders:  -     AMB POC XRAY, KNEE; COMPLETE, 4+ VIEW  -     DRAIN/INJECT LARGE JOINT/BURSA  -     triamcinolone acetonide (KENALOG-40) injection 80 mg; 80 mg, Intra-artICUlar, ONCE, 1 dose, On Mon 6/30/25 at 1400  -     lidocaine 1 % injection 2 mL; 2 mL, Intra-artICUlar, ONCE, 1 dose, On Mon 6/30/25 at 1400          Chief Complaint: Knee Pain (Right knee)      HPI:  Dana Richards is a 70 y.o. female with chief complaint of   Chief Complaint   Patient presents with    Knee Pain     Right knee     -6/30/2025: Right knee cortisone injection  - 10/20/2023: Right knee cortisone injection, over a year of relief  -10/21/2022: Right knee cortisone injection, nearly 1 year of relief    She been doing very well after her right knee corticosteroid injection 1 year ago.  In approximately September she had a distinct change from minimal pain to the more extended pain in the right knee and it is right through the center of her knee.  She has particular difficulty with stairs and getting up from a seated or squatting position.  She has tried Tylenol and ibuprofen which only takes a small amount of the pain away.      IMAGING:  Imaging read by myself and interpreted as